# Patient Record
Sex: FEMALE | Race: WHITE | NOT HISPANIC OR LATINO | Employment: FULL TIME | URBAN - METROPOLITAN AREA
[De-identification: names, ages, dates, MRNs, and addresses within clinical notes are randomized per-mention and may not be internally consistent; named-entity substitution may affect disease eponyms.]

---

## 2022-06-06 ENCOUNTER — ANESTHESIA (OUTPATIENT)
Dept: SURGERY | Facility: HOSPITAL | Age: 22
DRG: 958 | End: 2022-06-06
Payer: MEDICAID

## 2022-06-06 ENCOUNTER — ANESTHESIA EVENT (OUTPATIENT)
Dept: SURGERY | Facility: HOSPITAL | Age: 22
DRG: 958 | End: 2022-06-06
Payer: MEDICAID

## 2022-06-06 ENCOUNTER — HOSPITAL ENCOUNTER (INPATIENT)
Facility: HOSPITAL | Age: 22
LOS: 1 days | Discharge: ANOTHER HOSPITAL | DRG: 958 | End: 2022-06-07
Attending: STUDENT IN AN ORGANIZED HEALTH CARE EDUCATION/TRAINING PROGRAM | Admitting: SURGERY
Payer: MEDICAID

## 2022-06-06 DIAGNOSIS — S58.112A: ICD-10-CM

## 2022-06-06 DIAGNOSIS — S01.81XA FACIAL LACERATION, INITIAL ENCOUNTER: ICD-10-CM

## 2022-06-06 DIAGNOSIS — G82.20 PARAPLEGIA: ICD-10-CM

## 2022-06-06 DIAGNOSIS — J93.9 BILATERAL PNEUMOTHORACES: ICD-10-CM

## 2022-06-06 DIAGNOSIS — S22.089A CLOSED FRACTURE OF TWELFTH THORACIC VERTEBRA, UNSPECIFIED FRACTURE MORPHOLOGY, INITIAL ENCOUNTER: Primary | ICD-10-CM

## 2022-06-06 DIAGNOSIS — T14.90XA TRAUMA: ICD-10-CM

## 2022-06-06 DIAGNOSIS — S00.03XA HEMATOMA OF SCALP, INITIAL ENCOUNTER: ICD-10-CM

## 2022-06-06 DIAGNOSIS — S32.9XXA CLOSED NONDISPLACED FRACTURE OF PELVIS, UNSPECIFIED PART OF PELVIS, INITIAL ENCOUNTER: ICD-10-CM
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
## 2022-06-06 PROBLEM — S68.412A: Status: ACTIVE | Noted: 2022-06-06

## 2022-06-06 LAB
ABO + RH BLD: NORMAL
ALBUMIN SERPL-MCNC: 3.5 GM/DL (ref 3.5–5)
ALBUMIN/GLOB SERPL: 1.4 RATIO (ref 1.1–2)
ALP SERPL-CCNC: 98 UNIT/L (ref 40–150)
ALT SERPL-CCNC: 147 UNIT/L (ref 0–55)
APTT PPP: 27.1 SECONDS (ref 23.2–33.7)
AST SERPL-CCNC: 257 UNIT/L (ref 5–34)
BASOPHILS # BLD AUTO: 0.1 X10(3)/MCL (ref 0–0.2)
BASOPHILS NFR BLD AUTO: 0.4 %
BILIRUBIN DIRECT+TOT PNL SERPL-MCNC: 0.6 MG/DL
BLD PROD TYP BPU: NORMAL
BLOOD UNIT EXPIRATION DATE: NORMAL
BLOOD UNIT TYPE CODE: 6200
BUN SERPL-MCNC: 13.4 MG/DL (ref 7–18.7)
CALCIUM SERPL-MCNC: 8.7 MG/DL (ref 8.4–10.2)
CHLORIDE SERPL-SCNC: 109 MMOL/L (ref 98–107)
CO2 SERPL-SCNC: 20 MMOL/L (ref 22–29)
CREAT SERPL-MCNC: 1.04 MG/DL (ref 0.55–1.02)
CROSSMATCH INTERPRETATION: NORMAL
DISPENSE STATUS: NORMAL
EOSINOPHIL # BLD AUTO: 0.14 X10(3)/MCL (ref 0–0.9)
EOSINOPHIL NFR BLD AUTO: 0.6 %
ERYTHROCYTE [DISTWIDTH] IN BLOOD BY AUTOMATED COUNT: 13.7 % (ref 11.5–17)
ETHANOL SERPL-MCNC: <10 MG/DL
GLOBULIN SER-MCNC: 2.5 GM/DL (ref 2.4–3.5)
GLUCOSE SERPL-MCNC: 191 MG/DL (ref 74–100)
HCT VFR BLD AUTO: 38.7 % (ref 37–47)
HGB BLD-MCNC: 13.3 GM/DL (ref 12–16)
IMM GRANULOCYTES # BLD AUTO: 0.6 X10(3)/MCL (ref 0–0.02)
IMM GRANULOCYTES NFR BLD AUTO: 2.4 % (ref 0–0.43)
INR BLD: 1.3 (ref 0–1.3)
LACTATE SERPL-SCNC: 2.9 MMOL/L (ref 0.5–2.2)
LACTATE SERPL-SCNC: 3 MMOL/L (ref 0.5–2.2)
LYMPHOCYTES # BLD AUTO: 2.64 X10(3)/MCL (ref 0.6–4.6)
LYMPHOCYTES NFR BLD AUTO: 10.8 %
MCH RBC QN AUTO: 31.1 PG (ref 27–31)
MCHC RBC AUTO-ENTMCNC: 34.4 MG/DL (ref 33–36)
MCV RBC AUTO: 90.4 FL (ref 80–94)
MONOCYTES # BLD AUTO: 0.75 X10(3)/MCL (ref 0.1–1.3)
MONOCYTES NFR BLD AUTO: 3.1 %
NEUTROPHILS # BLD AUTO: 20.3 X10(3)/MCL (ref 2.1–9.2)
NEUTROPHILS NFR BLD AUTO: 82.7 %
NRBC BLD AUTO-RTO: 0 %
PLATELET # BLD AUTO: 346 X10(3)/MCL (ref 130–400)
PMV BLD AUTO: 9.8 FL (ref 9.4–12.4)
POTASSIUM SERPL-SCNC: 4 MMOL/L (ref 3.5–5.1)
PROT SERPL-MCNC: 6 GM/DL (ref 6.4–8.3)
PROTHROMBIN TIME: 16 SECONDS (ref 12.5–14.5)
RBC # BLD AUTO: 4.28 X10(6)/MCL (ref 4.2–5.4)
SODIUM SERPL-SCNC: 140 MMOL/L (ref 136–145)
UNIT NUMBER: NORMAL
WBC # SPEC AUTO: 24.5 X10(3)/MCL (ref 4.5–11.5)

## 2022-06-06 PROCEDURE — 63600175 PHARM REV CODE 636 W HCPCS: Performed by: STUDENT IN AN ORGANIZED HEALTH CARE EDUCATION/TRAINING PROGRAM

## 2022-06-06 PROCEDURE — 11012 DEB SKIN BONE AT FX SITE: CPT | Mod: 51,,, | Performed by: ORTHOPAEDIC SURGERY

## 2022-06-06 PROCEDURE — G0390 TRAUMA RESPONS W/HOSP CRITI: HCPCS

## 2022-06-06 PROCEDURE — 36000711: Performed by: ORTHOPAEDIC SURGERY

## 2022-06-06 PROCEDURE — 82077 ASSAY SPEC XCP UR&BREATH IA: CPT | Performed by: SURGERY

## 2022-06-06 PROCEDURE — 83605 ASSAY OF LACTIC ACID: CPT | Performed by: STUDENT IN AN ORGANIZED HEALTH CARE EDUCATION/TRAINING PROGRAM

## 2022-06-06 PROCEDURE — 37000008 HC ANESTHESIA 1ST 15 MINUTES: Performed by: ORTHOPAEDIC SURGERY

## 2022-06-06 PROCEDURE — 20000000 HC ICU ROOM

## 2022-06-06 PROCEDURE — 37000009 HC ANESTHESIA EA ADD 15 MINS: Performed by: ORTHOPAEDIC SURGERY

## 2022-06-06 PROCEDURE — 36415 COLL VENOUS BLD VENIPUNCTURE: CPT | Performed by: SURGERY

## 2022-06-06 PROCEDURE — 80053 COMPREHEN METABOLIC PANEL: CPT | Performed by: SURGERY

## 2022-06-06 PROCEDURE — 86920 COMPATIBILITY TEST SPIN: CPT | Performed by: SURGERY

## 2022-06-06 PROCEDURE — P9017 PLASMA 1 DONOR FRZ W/IN 8 HR: HCPCS | Performed by: SURGERY

## 2022-06-06 PROCEDURE — 96374 THER/PROPH/DIAG INJ IV PUSH: CPT

## 2022-06-06 PROCEDURE — 63600175 PHARM REV CODE 636 W HCPCS: Performed by: ORTHOPAEDIC SURGERY

## 2022-06-06 PROCEDURE — 36000710: Performed by: ORTHOPAEDIC SURGERY

## 2022-06-06 PROCEDURE — 63600175 PHARM REV CODE 636 W HCPCS

## 2022-06-06 PROCEDURE — 86850 RBC ANTIBODY SCREEN: CPT | Performed by: SURGERY

## 2022-06-06 PROCEDURE — 25900 AMPUTATION OF FOREARM: CPT | Mod: LT,,, | Performed by: ORTHOPAEDIC SURGERY

## 2022-06-06 PROCEDURE — 71000033 HC RECOVERY, INTIAL HOUR: Performed by: ORTHOPAEDIC SURGERY

## 2022-06-06 PROCEDURE — 25000003 PHARM REV CODE 250: Performed by: ORTHOPAEDIC SURGERY

## 2022-06-06 PROCEDURE — 63600175 PHARM REV CODE 636 W HCPCS: Performed by: ANESTHESIOLOGY

## 2022-06-06 PROCEDURE — P9016 RBC LEUKOCYTES REDUCED: HCPCS | Performed by: SURGERY

## 2022-06-06 PROCEDURE — 36415 COLL VENOUS BLD VENIPUNCTURE: CPT | Performed by: STUDENT IN AN ORGANIZED HEALTH CARE EDUCATION/TRAINING PROGRAM

## 2022-06-06 PROCEDURE — 85610 PROTHROMBIN TIME: CPT | Performed by: SURGERY

## 2022-06-06 PROCEDURE — 83605 ASSAY OF LACTIC ACID: CPT | Performed by: SURGERY

## 2022-06-06 PROCEDURE — 99285 EMERGENCY DEPT VISIT HI MDM: CPT | Mod: 25

## 2022-06-06 PROCEDURE — 85025 COMPLETE CBC W/AUTO DIFF WBC: CPT | Performed by: SURGERY

## 2022-06-06 PROCEDURE — 11012 PR DEBRIDE ASSOC OPEN FX/DISLO SKIN/MUS/BONE: ICD-10-PCS | Mod: 51,,, | Performed by: ORTHOPAEDIC SURGERY

## 2022-06-06 PROCEDURE — 25000003 PHARM REV CODE 250

## 2022-06-06 PROCEDURE — 25500020 PHARM REV CODE 255: Performed by: STUDENT IN AN ORGANIZED HEALTH CARE EDUCATION/TRAINING PROGRAM

## 2022-06-06 PROCEDURE — 85730 THROMBOPLASTIN TIME PARTIAL: CPT | Performed by: SURGERY

## 2022-06-06 PROCEDURE — 25900: ICD-10-PCS | Mod: AS,LT,, | Performed by: PHYSICIAN ASSISTANT

## 2022-06-06 PROCEDURE — 25900: ICD-10-PCS | Mod: LT,,, | Performed by: ORTHOPAEDIC SURGERY

## 2022-06-06 PROCEDURE — 25900 AMPUTATION OF FOREARM: CPT | Mod: AS,LT,, | Performed by: PHYSICIAN ASSISTANT

## 2022-06-06 RX ORDER — GABAPENTIN 300 MG/1
300 CAPSULE ORAL 3 TIMES DAILY
Status: DISCONTINUED | OUTPATIENT
Start: 2022-06-07 | End: 2022-06-07 | Stop reason: HOSPADM

## 2022-06-06 RX ORDER — ONDANSETRON 2 MG/ML
4 INJECTION INTRAMUSCULAR; INTRAVENOUS DAILY PRN
Status: DISCONTINUED | OUTPATIENT
Start: 2022-06-06 | End: 2022-06-06

## 2022-06-06 RX ORDER — ONDANSETRON 2 MG/ML
INJECTION INTRAMUSCULAR; INTRAVENOUS
Status: DISCONTINUED | OUTPATIENT
Start: 2022-06-06 | End: 2022-06-06

## 2022-06-06 RX ORDER — FENTANYL CITRATE 50 UG/ML
INJECTION, SOLUTION INTRAMUSCULAR; INTRAVENOUS
Status: DISPENSED
Start: 2022-06-06 | End: 2022-06-07

## 2022-06-06 RX ORDER — BACITRACIN 500 [USP'U]/G
OINTMENT TOPICAL
Status: DISCONTINUED | OUTPATIENT
Start: 2022-06-06 | End: 2022-06-06 | Stop reason: HOSPADM

## 2022-06-06 RX ORDER — MIDAZOLAM HYDROCHLORIDE 1 MG/ML
INJECTION INTRAMUSCULAR; INTRAVENOUS
Status: DISCONTINUED | OUTPATIENT
Start: 2022-06-06 | End: 2022-06-06

## 2022-06-06 RX ORDER — ACETAMINOPHEN 10 MG/ML
1000 INJECTION, SOLUTION INTRAVENOUS ONCE
Status: COMPLETED | OUTPATIENT
Start: 2022-06-06 | End: 2022-06-06

## 2022-06-06 RX ORDER — ONDANSETRON 2 MG/ML
INJECTION INTRAMUSCULAR; INTRAVENOUS
Status: DISPENSED
Start: 2022-06-06 | End: 2022-06-07

## 2022-06-06 RX ORDER — SODIUM CHLORIDE 0.9 % (FLUSH) 0.9 %
10 SYRINGE (ML) INJECTION
Status: DISCONTINUED | OUTPATIENT
Start: 2022-06-06 | End: 2022-06-07 | Stop reason: HOSPADM

## 2022-06-06 RX ORDER — SODIUM CHLORIDE, SODIUM LACTATE, POTASSIUM CHLORIDE, CALCIUM CHLORIDE 600; 310; 30; 20 MG/100ML; MG/100ML; MG/100ML; MG/100ML
INJECTION, SOLUTION INTRAVENOUS CONTINUOUS
Status: DISCONTINUED | OUTPATIENT
Start: 2022-06-07 | End: 2022-06-07 | Stop reason: HOSPADM

## 2022-06-06 RX ORDER — KETAMINE HYDROCHLORIDE 100 MG/ML
INJECTION, SOLUTION INTRAMUSCULAR; INTRAVENOUS
Status: DISCONTINUED | OUTPATIENT
Start: 2022-06-06 | End: 2022-06-06

## 2022-06-06 RX ORDER — MORPHINE SULFATE 4 MG/ML
4 INJECTION, SOLUTION INTRAMUSCULAR; INTRAVENOUS
Status: DISCONTINUED | OUTPATIENT
Start: 2022-06-07 | End: 2022-06-07 | Stop reason: HOSPADM

## 2022-06-06 RX ORDER — OXYCODONE HYDROCHLORIDE 5 MG/1
10 TABLET ORAL EVERY 6 HOURS PRN
Status: DISCONTINUED | OUTPATIENT
Start: 2022-06-06 | End: 2022-06-07 | Stop reason: HOSPADM

## 2022-06-06 RX ORDER — ACETAMINOPHEN 500 MG
1000 TABLET ORAL EVERY 6 HOURS
Status: DISCONTINUED | OUTPATIENT
Start: 2022-06-07 | End: 2022-06-07 | Stop reason: HOSPADM

## 2022-06-06 RX ORDER — HYDROMORPHONE HYDROCHLORIDE 2 MG/ML
0.5 INJECTION, SOLUTION INTRAMUSCULAR; INTRAVENOUS; SUBCUTANEOUS EVERY 5 MIN PRN
Status: DISCONTINUED | OUTPATIENT
Start: 2022-06-06 | End: 2022-06-07 | Stop reason: HOSPADM

## 2022-06-06 RX ORDER — BUPIVACAINE HYDROCHLORIDE AND EPINEPHRINE 2.5; 5 MG/ML; UG/ML
INJECTION, SOLUTION EPIDURAL; INFILTRATION; INTRACAUDAL; PERINEURAL
Status: DISCONTINUED | OUTPATIENT
Start: 2022-06-06 | End: 2022-06-06 | Stop reason: HOSPADM

## 2022-06-06 RX ORDER — ROCURONIUM BROMIDE 10 MG/ML
INJECTION, SOLUTION INTRAVENOUS
Status: DISCONTINUED | OUTPATIENT
Start: 2022-06-06 | End: 2022-06-06

## 2022-06-06 RX ORDER — TRANEXAMIC ACID 100 MG/ML
INJECTION, SOLUTION INTRAVENOUS
Status: DISPENSED
Start: 2022-06-06 | End: 2022-06-07

## 2022-06-06 RX ORDER — METHOCARBAMOL 100 MG/ML
1000 INJECTION, SOLUTION INTRAMUSCULAR; INTRAVENOUS EVERY 8 HOURS
Status: DISCONTINUED | OUTPATIENT
Start: 2022-06-06 | End: 2022-06-07 | Stop reason: HOSPADM

## 2022-06-06 RX ORDER — OXYCODONE HYDROCHLORIDE 5 MG/1
5 TABLET ORAL EVERY 6 HOURS PRN
Status: DISCONTINUED | OUTPATIENT
Start: 2022-06-06 | End: 2022-06-07 | Stop reason: HOSPADM

## 2022-06-06 RX ORDER — FENTANYL CITRATE 50 UG/ML
INJECTION, SOLUTION INTRAMUSCULAR; INTRAVENOUS
Status: DISCONTINUED | OUTPATIENT
Start: 2022-06-06 | End: 2022-06-06

## 2022-06-06 RX ORDER — ONDANSETRON 2 MG/ML
INJECTION INTRAMUSCULAR; INTRAVENOUS CODE/TRAUMA/SEDATION MEDICATION
Status: COMPLETED | OUTPATIENT
Start: 2022-06-06 | End: 2022-06-06

## 2022-06-06 RX ORDER — PHENYLEPHRINE HYDROCHLORIDE 10 MG/ML
INJECTION INTRAVENOUS
Status: DISCONTINUED | OUTPATIENT
Start: 2022-06-06 | End: 2022-06-06

## 2022-06-06 RX ORDER — PROPOFOL 10 MG/ML
VIAL (ML) INTRAVENOUS
Status: DISCONTINUED | OUTPATIENT
Start: 2022-06-06 | End: 2022-06-06

## 2022-06-06 RX ORDER — FENTANYL CITRATE 50 UG/ML
INJECTION, SOLUTION INTRAMUSCULAR; INTRAVENOUS CODE/TRAUMA/SEDATION MEDICATION
Status: COMPLETED | OUTPATIENT
Start: 2022-06-06 | End: 2022-06-06

## 2022-06-06 RX ORDER — LIDOCAINE HYDROCHLORIDE 20 MG/ML
INJECTION, SOLUTION EPIDURAL; INFILTRATION; INTRACAUDAL; PERINEURAL
Status: DISCONTINUED | OUTPATIENT
Start: 2022-06-06 | End: 2022-06-06

## 2022-06-06 RX ORDER — SUCCINYLCHOLINE CHLORIDE 20 MG/ML
INJECTION INTRAMUSCULAR; INTRAVENOUS
Status: DISCONTINUED | OUTPATIENT
Start: 2022-06-06 | End: 2022-06-06

## 2022-06-06 RX ORDER — ONDANSETRON 2 MG/ML
4 INJECTION INTRAMUSCULAR; INTRAVENOUS EVERY 6 HOURS PRN
Status: DISCONTINUED | OUTPATIENT
Start: 2022-06-06 | End: 2022-06-07 | Stop reason: HOSPADM

## 2022-06-06 RX ORDER — CEFAZOLIN SODIUM 2 G/50ML
2 SOLUTION INTRAVENOUS
Status: DISCONTINUED | OUTPATIENT
Start: 2022-06-06 | End: 2022-06-07 | Stop reason: HOSPADM

## 2022-06-06 RX ORDER — MORPHINE SULFATE 4 MG/ML
3 INJECTION, SOLUTION INTRAMUSCULAR; INTRAVENOUS
Status: DISCONTINUED | OUTPATIENT
Start: 2022-06-06 | End: 2022-06-06

## 2022-06-06 RX ORDER — MORPHINE SULFATE 10 MG/ML
4 INJECTION INTRAMUSCULAR; INTRAVENOUS; SUBCUTANEOUS
Status: DISCONTINUED | OUTPATIENT
Start: 2022-06-06 | End: 2022-06-06

## 2022-06-06 RX ADMIN — MIDAZOLAM 2 MG: 1 INJECTION INTRAMUSCULAR; INTRAVENOUS at 07:06

## 2022-06-06 RX ADMIN — KETAMINE HYDROCHLORIDE 20 MG: 100 INJECTION, SOLUTION, CONCENTRATE INTRAMUSCULAR; INTRAVENOUS at 09:06

## 2022-06-06 RX ADMIN — LIDOCAINE HYDROCHLORIDE 100 MG: 20 INJECTION, SOLUTION EPIDURAL; INFILTRATION; INTRACAUDAL; PERINEURAL at 07:06

## 2022-06-06 RX ADMIN — ACETAMINOPHEN 1000 MG: 10 INJECTION, SOLUTION INTRAVENOUS at 10:06

## 2022-06-06 RX ADMIN — IOPAMIDOL 100 ML: 755 INJECTION, SOLUTION INTRAVENOUS at 07:06

## 2022-06-06 RX ADMIN — SODIUM CHLORIDE, POTASSIUM CHLORIDE, SODIUM LACTATE AND CALCIUM CHLORIDE: 600; 310; 30; 20 INJECTION, SOLUTION INTRAVENOUS at 11:06

## 2022-06-06 RX ADMIN — PHENYLEPHRINE HYDROCHLORIDE 200 MCG: 10 INJECTION INTRAVENOUS at 08:06

## 2022-06-06 RX ADMIN — ONDANSETRON 4 MG: 2 INJECTION INTRAMUSCULAR; INTRAVENOUS at 07:06

## 2022-06-06 RX ADMIN — PHENYLEPHRINE HYDROCHLORIDE 200 MCG: 10 INJECTION INTRAVENOUS at 07:06

## 2022-06-06 RX ADMIN — METHOCARBAMOL 1000 MG: 100 INJECTION, SOLUTION INTRAMUSCULAR; INTRAVENOUS at 11:06

## 2022-06-06 RX ADMIN — PROPOFOL 100 MG: 10 INJECTION, EMULSION INTRAVENOUS at 07:06

## 2022-06-06 RX ADMIN — CEFAZOLIN SODIUM 2 G: 2 SOLUTION INTRAVENOUS at 08:06

## 2022-06-06 RX ADMIN — HYDROMORPHONE HYDROCHLORIDE 0.5 MG: 2 INJECTION INTRAMUSCULAR; INTRAVENOUS; SUBCUTANEOUS at 10:06

## 2022-06-06 RX ADMIN — ROCURONIUM BROMIDE 40 MG: 10 INJECTION, SOLUTION INTRAVENOUS at 07:06

## 2022-06-06 RX ADMIN — IOPAMIDOL 75 ML: 755 INJECTION, SOLUTION INTRAVENOUS at 11:06

## 2022-06-06 RX ADMIN — SODIUM CHLORIDE, SODIUM GLUCONATE, SODIUM ACETATE, POTASSIUM CHLORIDE AND MAGNESIUM CHLORIDE: 526; 502; 368; 37; 30 INJECTION, SOLUTION INTRAVENOUS at 07:06

## 2022-06-06 RX ADMIN — ONDANSETRON 4 MG: 2 INJECTION INTRAMUSCULAR; INTRAVENOUS at 08:06

## 2022-06-06 RX ADMIN — ROCURONIUM BROMIDE 10 MG: 10 INJECTION, SOLUTION INTRAVENOUS at 07:06

## 2022-06-06 RX ADMIN — FENTANYL CITRATE 100 MCG: 50 INJECTION, SOLUTION INTRAMUSCULAR; INTRAVENOUS at 07:06

## 2022-06-06 RX ADMIN — MORPHINE SULFATE 4 MG: 10 INJECTION INTRAVENOUS at 11:06

## 2022-06-06 RX ADMIN — SUGAMMADEX 200 MG: 100 INJECTION, SOLUTION INTRAVENOUS at 09:06

## 2022-06-06 RX ADMIN — FENTANYL CITRATE 50 MCG: 50 INJECTION, SOLUTION INTRAMUSCULAR; INTRAVENOUS at 09:06

## 2022-06-06 RX ADMIN — SUCCINYLCHOLINE CHLORIDE 140 MG: 20 INJECTION, SOLUTION INTRAMUSCULAR; INTRAVENOUS at 07:06
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
## 2022-06-07 VITALS
SYSTOLIC BLOOD PRESSURE: 94 MMHG | HEART RATE: 91 BPM | TEMPERATURE: 99 F | RESPIRATION RATE: 20 BRPM | OXYGEN SATURATION: 100 % | DIASTOLIC BLOOD PRESSURE: 65 MMHG

## 2022-06-07 LAB
ABO + RH BLD: NORMAL
ALBUMIN SERPL-MCNC: 3.4 GM/DL (ref 3.5–5)
ALBUMIN SERPL-MCNC: 3.4 GM/DL (ref 3.5–5)
ALBUMIN/GLOB SERPL: 1.4 RATIO (ref 1.1–2)
ALBUMIN/GLOB SERPL: 1.4 RATIO (ref 1.1–2)
ALP SERPL-CCNC: 84 UNIT/L (ref 40–150)
ALP SERPL-CCNC: 86 UNIT/L (ref 40–150)
ALT SERPL-CCNC: 123 UNIT/L (ref 0–55)
ALT SERPL-CCNC: 129 UNIT/L (ref 0–55)
AMPHET UR QL SCN: NEGATIVE
APPEARANCE UR: CLEAR
APTT PPP: 20.2 SECONDS (ref 23.2–33.7)
AST SERPL-CCNC: 210 UNIT/L (ref 5–34)
AST SERPL-CCNC: 242 UNIT/L (ref 5–34)
BACTERIA #/AREA URNS AUTO: ABNORMAL /HPF
BARBITURATE SCN PRESENT UR: NEGATIVE
BASOPHILS # BLD AUTO: 0.02 X10(3)/MCL (ref 0–0.2)
BASOPHILS # BLD AUTO: 0.02 X10(3)/MCL (ref 0–0.2)
BASOPHILS # BLD AUTO: 0.03 X10(3)/MCL (ref 0–0.2)
BASOPHILS NFR BLD AUTO: 0.2 %
BASOPHILS NFR BLD AUTO: 0.2 %
BASOPHILS NFR BLD AUTO: 0.3 %
BENZODIAZ UR QL SCN: POSITIVE
BILIRUB UR QL STRIP.AUTO: NEGATIVE MG/DL
BILIRUBIN DIRECT+TOT PNL SERPL-MCNC: 1.4 MG/DL
BILIRUBIN DIRECT+TOT PNL SERPL-MCNC: 1.4 MG/DL
BLD PROD TYP BPU: NORMAL
BLOOD UNIT EXPIRATION DATE: NORMAL
BLOOD UNIT TYPE CODE: 9500
BUN SERPL-MCNC: 10.4 MG/DL (ref 7–18.7)
BUN SERPL-MCNC: 11.2 MG/DL (ref 7–18.7)
CALCIUM SERPL-MCNC: 8.7 MG/DL (ref 8.4–10.2)
CALCIUM SERPL-MCNC: 8.8 MG/DL (ref 8.4–10.2)
CANNABINOIDS UR QL SCN: NEGATIVE
CHLORIDE SERPL-SCNC: 106 MMOL/L (ref 98–107)
CHLORIDE SERPL-SCNC: 107 MMOL/L (ref 98–107)
CO2 SERPL-SCNC: 23 MMOL/L (ref 22–29)
CO2 SERPL-SCNC: 25 MMOL/L (ref 22–29)
COCAINE UR QL SCN: NEGATIVE
COLOR UR AUTO: YELLOW
CREAT SERPL-MCNC: 0.83 MG/DL (ref 0.55–1.02)
CREAT SERPL-MCNC: 0.88 MG/DL (ref 0.55–1.02)
CROSSMATCH INTERPRETATION: NORMAL
DISPENSE STATUS: NORMAL
EOSINOPHIL # BLD AUTO: 0 X10(3)/MCL (ref 0–0.9)
EOSINOPHIL NFR BLD AUTO: 0 %
ERYTHROCYTE [DISTWIDTH] IN BLOOD BY AUTOMATED COUNT: 14.8 % (ref 11.5–17)
ERYTHROCYTE [DISTWIDTH] IN BLOOD BY AUTOMATED COUNT: 15 % (ref 11.5–17)
ERYTHROCYTE [DISTWIDTH] IN BLOOD BY AUTOMATED COUNT: 15.3 % (ref 11.5–17)
FENTANYL UR QL SCN: POSITIVE
GLOBULIN SER-MCNC: 2.4 GM/DL (ref 2.4–3.5)
GLOBULIN SER-MCNC: 2.5 GM/DL (ref 2.4–3.5)
GLUCOSE SERPL-MCNC: 142 MG/DL (ref 74–100)
GLUCOSE SERPL-MCNC: 152 MG/DL (ref 74–100)
GLUCOSE UR QL STRIP.AUTO: NEGATIVE MG/DL
HCT VFR BLD AUTO: 36.7 % (ref 37–47)
HCT VFR BLD AUTO: 36.7 % (ref 37–47)
HCT VFR BLD AUTO: 37.8 % (ref 37–47)
HGB BLD-MCNC: 12.3 GM/DL (ref 12–16)
HGB BLD-MCNC: 12.7 GM/DL (ref 12–16)
HGB BLD-MCNC: 12.8 GM/DL (ref 12–16)
IMM GRANULOCYTES # BLD AUTO: 0.03 X10(3)/MCL (ref 0–0.02)
IMM GRANULOCYTES # BLD AUTO: 0.05 X10(3)/MCL (ref 0–0.02)
IMM GRANULOCYTES # BLD AUTO: 0.05 X10(3)/MCL (ref 0–0.02)
IMM GRANULOCYTES NFR BLD AUTO: 0.3 % (ref 0–0.43)
IMM GRANULOCYTES NFR BLD AUTO: 0.4 % (ref 0–0.43)
IMM GRANULOCYTES NFR BLD AUTO: 0.4 % (ref 0–0.43)
INR BLD: 1.24 (ref 0–1.3)
KETONES UR QL STRIP.AUTO: NEGATIVE MG/DL
LACTATE SERPL-SCNC: 1.6 MMOL/L (ref 0.5–2.2)
LEUKOCYTE ESTERASE UR QL STRIP.AUTO: NEGATIVE UNIT/L
LYMPHOCYTES # BLD AUTO: 0.25 X10(3)/MCL (ref 0.6–4.6)
LYMPHOCYTES # BLD AUTO: 0.4 X10(3)/MCL (ref 0.6–4.6)
LYMPHOCYTES # BLD AUTO: 0.6 X10(3)/MCL (ref 0.6–4.6)
LYMPHOCYTES NFR BLD AUTO: 2.1 %
LYMPHOCYTES NFR BLD AUTO: 3.5 %
LYMPHOCYTES NFR BLD AUTO: 5.8 %
MAGNESIUM SERPL-MCNC: 2 MG/DL (ref 1.6–2.6)
MAGNESIUM SERPL-MCNC: 2 MG/DL (ref 1.6–2.6)
MCH RBC QN AUTO: 29 PG (ref 27–31)
MCH RBC QN AUTO: 29.4 PG (ref 27–31)
MCH RBC QN AUTO: 29.7 PG (ref 27–31)
MCHC RBC AUTO-ENTMCNC: 33.5 MG/DL (ref 33–36)
MCHC RBC AUTO-ENTMCNC: 33.9 MG/DL (ref 33–36)
MCHC RBC AUTO-ENTMCNC: 34.6 MG/DL (ref 33–36)
MCV RBC AUTO: 85.7 FL (ref 80–94)
MCV RBC AUTO: 86.6 FL (ref 80–94)
MCV RBC AUTO: 86.7 FL (ref 80–94)
MDMA UR QL SCN: NEGATIVE
MONOCYTES # BLD AUTO: 0.77 X10(3)/MCL (ref 0.1–1.3)
MONOCYTES # BLD AUTO: 0.82 X10(3)/MCL (ref 0.1–1.3)
MONOCYTES # BLD AUTO: 0.86 X10(3)/MCL (ref 0.1–1.3)
MONOCYTES NFR BLD AUTO: 7.1 %
MONOCYTES NFR BLD AUTO: 7.3 %
MONOCYTES NFR BLD AUTO: 7.4 %
NEUTROPHILS # BLD AUTO: 10.3 X10(3)/MCL (ref 2.1–9.2)
NEUTROPHILS # BLD AUTO: 10.7 X10(3)/MCL (ref 2.1–9.2)
NEUTROPHILS # BLD AUTO: 8.9 X10(3)/MCL (ref 2.1–9.2)
NEUTROPHILS NFR BLD AUTO: 86.2 %
NEUTROPHILS NFR BLD AUTO: 88.8 %
NEUTROPHILS NFR BLD AUTO: 90 %
NITRITE UR QL STRIP.AUTO: NEGATIVE
NRBC BLD AUTO-RTO: 0 %
OPIATES UR QL SCN: POSITIVE
PCP UR QL: NEGATIVE
PH UR STRIP.AUTO: 5.5 [PH]
PH UR: 5.5 [PH] (ref 3–11)
PHOSPHATE SERPL-MCNC: 2.8 MG/DL (ref 2.3–4.7)
PHOSPHATE SERPL-MCNC: 4.3 MG/DL (ref 2.3–4.7)
PLATELET # BLD AUTO: 164 X10(3)/MCL (ref 130–400)
PLATELET # BLD AUTO: 178 X10(3)/MCL (ref 130–400)
PLATELET # BLD AUTO: 184 X10(3)/MCL (ref 130–400)
PMV BLD AUTO: 10.4 FL (ref 9.4–12.4)
PMV BLD AUTO: 9.5 FL (ref 9.4–12.4)
PMV BLD AUTO: 9.9 FL (ref 9.4–12.4)
POTASSIUM SERPL-SCNC: 3.5 MMOL/L (ref 3.5–5.1)
POTASSIUM SERPL-SCNC: 4.1 MMOL/L (ref 3.5–5.1)
PROT SERPL-MCNC: 5.8 GM/DL (ref 6.4–8.3)
PROT SERPL-MCNC: 5.9 GM/DL (ref 6.4–8.3)
PROT UR QL STRIP.AUTO: ABNORMAL MG/DL
PROTHROMBIN TIME: 15.5 SECONDS (ref 12.5–14.5)
RBC # BLD AUTO: 4.24 X10(6)/MCL (ref 4.2–5.4)
RBC # BLD AUTO: 4.28 X10(6)/MCL (ref 4.2–5.4)
RBC # BLD AUTO: 4.36 X10(6)/MCL (ref 4.2–5.4)
RBC #/AREA URNS AUTO: 50 /HPF
RBC UR QL AUTO: ABNORMAL UNIT/L
SARS-COV-2 RDRP RESP QL NAA+PROBE: NEGATIVE
SODIUM SERPL-SCNC: 138 MMOL/L (ref 136–145)
SODIUM SERPL-SCNC: 139 MMOL/L (ref 136–145)
SP GR UR STRIP.AUTO: >=1.04 (ref 1–1.03)
SPECIFIC GRAVITY, URINE AUTO (.000) (OHS): >=1.04 (ref 1–1.03)
SQUAMOUS #/AREA URNS AUTO: <4 /LPF
UNIT NUMBER: NORMAL
UROBILINOGEN UR STRIP-ACNC: 1 MG/DL
WBC # SPEC AUTO: 10.4 X10(3)/MCL (ref 4.5–11.5)
WBC # SPEC AUTO: 11.6 X10(3)/MCL (ref 4.5–11.5)
WBC # SPEC AUTO: 11.8 X10(3)/MCL (ref 4.5–11.5)
WBC #/AREA URNS AUTO: <5 /HPF

## 2022-06-07 PROCEDURE — 85025 COMPLETE CBC W/AUTO DIFF WBC: CPT | Performed by: STUDENT IN AN ORGANIZED HEALTH CARE EDUCATION/TRAINING PROGRAM

## 2022-06-07 PROCEDURE — 36415 COLL VENOUS BLD VENIPUNCTURE: CPT | Performed by: STUDENT IN AN ORGANIZED HEALTH CARE EDUCATION/TRAINING PROGRAM

## 2022-06-07 PROCEDURE — 84100 ASSAY OF PHOSPHORUS: CPT | Performed by: SURGERY

## 2022-06-07 PROCEDURE — 83605 ASSAY OF LACTIC ACID: CPT | Performed by: SURGERY

## 2022-06-07 PROCEDURE — 85610 PROTHROMBIN TIME: CPT | Performed by: STUDENT IN AN ORGANIZED HEALTH CARE EDUCATION/TRAINING PROGRAM

## 2022-06-07 PROCEDURE — 25500020 PHARM REV CODE 255: Performed by: SURGERY

## 2022-06-07 PROCEDURE — 84100 ASSAY OF PHOSPHORUS: CPT | Performed by: STUDENT IN AN ORGANIZED HEALTH CARE EDUCATION/TRAINING PROGRAM

## 2022-06-07 PROCEDURE — 80307 DRUG TEST PRSMV CHEM ANLYZR: CPT | Performed by: STUDENT IN AN ORGANIZED HEALTH CARE EDUCATION/TRAINING PROGRAM

## 2022-06-07 PROCEDURE — 85730 THROMBOPLASTIN TIME PARTIAL: CPT | Performed by: STUDENT IN AN ORGANIZED HEALTH CARE EDUCATION/TRAINING PROGRAM

## 2022-06-07 PROCEDURE — 83735 ASSAY OF MAGNESIUM: CPT | Performed by: STUDENT IN AN ORGANIZED HEALTH CARE EDUCATION/TRAINING PROGRAM

## 2022-06-07 PROCEDURE — 80053 COMPREHEN METABOLIC PANEL: CPT | Performed by: SURGERY

## 2022-06-07 PROCEDURE — 81001 URINALYSIS AUTO W/SCOPE: CPT | Performed by: STUDENT IN AN ORGANIZED HEALTH CARE EDUCATION/TRAINING PROGRAM

## 2022-06-07 PROCEDURE — 99254 PR INITIAL INPATIENT CONSULT,LEVL IV: ICD-10-PCS | Mod: ,,, | Performed by: THORACIC SURGERY (CARDIOTHORACIC VASCULAR SURGERY)

## 2022-06-07 PROCEDURE — 63600175 PHARM REV CODE 636 W HCPCS: Performed by: STUDENT IN AN ORGANIZED HEALTH CARE EDUCATION/TRAINING PROGRAM

## 2022-06-07 PROCEDURE — 25000003 PHARM REV CODE 250: Performed by: SURGERY

## 2022-06-07 PROCEDURE — 87635 SARS-COV-2 COVID-19 AMP PRB: CPT | Performed by: NURSE PRACTITIONER

## 2022-06-07 PROCEDURE — 63600175 PHARM REV CODE 636 W HCPCS: Performed by: SURGERY

## 2022-06-07 PROCEDURE — 99231 SBSQ HOSP IP/OBS SF/LOW 25: CPT | Mod: ,,, | Performed by: SURGERY

## 2022-06-07 PROCEDURE — 25000003 PHARM REV CODE 250: Performed by: STUDENT IN AN ORGANIZED HEALTH CARE EDUCATION/TRAINING PROGRAM

## 2022-06-07 PROCEDURE — 63600175 PHARM REV CODE 636 W HCPCS: Performed by: ANESTHESIOLOGY

## 2022-06-07 PROCEDURE — 83735 ASSAY OF MAGNESIUM: CPT | Performed by: SURGERY

## 2022-06-07 PROCEDURE — 99254 IP/OBS CNSLTJ NEW/EST MOD 60: CPT | Mod: ,,, | Performed by: THORACIC SURGERY (CARDIOTHORACIC VASCULAR SURGERY)

## 2022-06-07 PROCEDURE — 80053 COMPREHEN METABOLIC PANEL: CPT | Performed by: STUDENT IN AN ORGANIZED HEALTH CARE EDUCATION/TRAINING PROGRAM

## 2022-06-07 PROCEDURE — 36415 COLL VENOUS BLD VENIPUNCTURE: CPT | Performed by: SURGERY

## 2022-06-07 PROCEDURE — 99231 PR SUBSEQUENT HOSPITAL CARE,LEVL I: ICD-10-PCS | Mod: ,,, | Performed by: SURGERY

## 2022-06-07 RX ORDER — HYDROMORPHONE HYDROCHLORIDE 2 MG/ML
0.5 INJECTION, SOLUTION INTRAMUSCULAR; INTRAVENOUS; SUBCUTANEOUS EVERY 5 MIN PRN
Status: CANCELLED | OUTPATIENT
Start: 2022-06-07

## 2022-06-07 RX ORDER — ONDANSETRON 2 MG/ML
4 INJECTION INTRAMUSCULAR; INTRAVENOUS EVERY 6 HOURS PRN
Status: CANCELLED | OUTPATIENT
Start: 2022-06-07

## 2022-06-07 RX ORDER — SODIUM CHLORIDE 0.9 % (FLUSH) 0.9 %
10 SYRINGE (ML) INJECTION
Status: CANCELLED | OUTPATIENT
Start: 2022-06-07

## 2022-06-07 RX ORDER — SODIUM CHLORIDE, SODIUM LACTATE, POTASSIUM CHLORIDE, CALCIUM CHLORIDE 600; 310; 30; 20 MG/100ML; MG/100ML; MG/100ML; MG/100ML
INJECTION, SOLUTION INTRAVENOUS CONTINUOUS
Status: CANCELLED | OUTPATIENT
Start: 2022-06-07

## 2022-06-07 RX ORDER — METHOCARBAMOL 100 MG/ML
1000 INJECTION, SOLUTION INTRAMUSCULAR; INTRAVENOUS EVERY 8 HOURS
Status: CANCELLED | OUTPATIENT
Start: 2022-06-07

## 2022-06-07 RX ORDER — BACITRACIN 500 [USP'U]/G
OINTMENT TOPICAL 3 TIMES DAILY
Status: CANCELLED | OUTPATIENT
Start: 2022-06-07

## 2022-06-07 RX ORDER — BACITRACIN 500 [USP'U]/G
OINTMENT TOPICAL 3 TIMES DAILY
Status: DISCONTINUED | OUTPATIENT
Start: 2022-06-07 | End: 2022-06-07 | Stop reason: HOSPADM

## 2022-06-07 RX ORDER — CEFAZOLIN SODIUM 2 G/50ML
2 SOLUTION INTRAVENOUS
Status: CANCELLED | OUTPATIENT
Start: 2022-06-07

## 2022-06-07 RX ORDER — ACETAMINOPHEN 500 MG
1000 TABLET ORAL EVERY 6 HOURS
Status: CANCELLED | OUTPATIENT
Start: 2022-06-07

## 2022-06-07 RX ORDER — OXYCODONE HYDROCHLORIDE 5 MG/1
10 TABLET ORAL EVERY 6 HOURS PRN
Status: CANCELLED | OUTPATIENT
Start: 2022-06-07 | End: 2022-06-09

## 2022-06-07 RX ORDER — OXYCODONE HYDROCHLORIDE 5 MG/1
5 TABLET ORAL EVERY 6 HOURS PRN
Status: CANCELLED | OUTPATIENT
Start: 2022-06-07 | End: 2022-06-09

## 2022-06-07 RX ORDER — GABAPENTIN 300 MG/1
300 CAPSULE ORAL 3 TIMES DAILY
Status: CANCELLED | OUTPATIENT
Start: 2022-06-07

## 2022-06-07 RX ORDER — MORPHINE SULFATE 4 MG/ML
4 INJECTION, SOLUTION INTRAMUSCULAR; INTRAVENOUS
Status: CANCELLED | OUTPATIENT
Start: 2022-06-07

## 2022-06-07 RX ADMIN — MORPHINE SULFATE 4 MG: 4 INJECTION INTRAVENOUS at 06:06

## 2022-06-07 RX ADMIN — GABAPENTIN 300 MG: 300 CAPSULE ORAL at 11:06

## 2022-06-07 RX ADMIN — MORPHINE SULFATE 4 MG: 4 INJECTION INTRAVENOUS at 11:06

## 2022-06-07 RX ADMIN — MORPHINE SULFATE 4 MG: 4 INJECTION INTRAVENOUS at 04:06

## 2022-06-07 RX ADMIN — ONDANSETRON 4 MG: 2 INJECTION INTRAMUSCULAR; INTRAVENOUS at 01:06

## 2022-06-07 RX ADMIN — CEFAZOLIN SODIUM 2 G: 2 SOLUTION INTRAVENOUS at 04:06

## 2022-06-07 RX ADMIN — HYDROMORPHONE HYDROCHLORIDE 0.5 MG: 2 INJECTION INTRAMUSCULAR; INTRAVENOUS; SUBCUTANEOUS at 03:06

## 2022-06-07 RX ADMIN — SODIUM CHLORIDE, POTASSIUM CHLORIDE, SODIUM LACTATE AND CALCIUM CHLORIDE: 600; 310; 30; 20 INJECTION, SOLUTION INTRAVENOUS at 11:06

## 2022-06-07 RX ADMIN — METHOCARBAMOL 1000 MG: 100 INJECTION, SOLUTION INTRAMUSCULAR; INTRAVENOUS at 01:06

## 2022-06-07 RX ADMIN — ACETAMINOPHEN 1000 MG: 500 TABLET, FILM COATED ORAL at 06:06

## 2022-06-07 RX ADMIN — OXYCODONE 5 MG: 5 TABLET ORAL at 01:06

## 2022-06-07 RX ADMIN — ACETAMINOPHEN 1000 MG: 500 TABLET, FILM COATED ORAL at 11:06

## 2022-06-07 RX ADMIN — MORPHINE SULFATE 4 MG: 4 INJECTION INTRAVENOUS at 02:06

## 2022-06-07 RX ADMIN — BACITRACIN: 500 OINTMENT TOPICAL at 02:06

## 2022-06-07 RX ADMIN — METHOCARBAMOL 1000 MG: 100 INJECTION, SOLUTION INTRAMUSCULAR; INTRAVENOUS at 05:06

## 2022-06-07 RX ADMIN — MORPHINE SULFATE 4 MG: 4 INJECTION INTRAVENOUS at 07:06

## 2022-06-07 RX ADMIN — GABAPENTIN 300 MG: 300 CAPSULE ORAL at 02:06

## 2022-06-07 RX ADMIN — HYDROMORPHONE HYDROCHLORIDE 0.5 MG: 2 INJECTION INTRAMUSCULAR; INTRAVENOUS; SUBCUTANEOUS at 02:06

## 2022-06-07 RX ADMIN — HYDROMORPHONE HYDROCHLORIDE 0.5 MG: 2 INJECTION INTRAMUSCULAR; INTRAVENOUS; SUBCUTANEOUS at 09:06
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
## 2022-06-07 NOTE — PLAN OF CARE
Lidia with [facility] called with Rm # [id number]. Dr Reji Caal is the accepting doctor.   I spoke to Cami SUN and she stated pt would need to travel by air med. I called [facility] [phone] and spoke with Steven and put pt in Will Call.  I called and spoke to pt's mom Juanita and gave her update. I spoke to pt, she nodded she understood, she seems very sleepy.  DC packet with med rec, disc with all radiology films given to nurse Kay. Pt will need stat raid covid. Once Covid result is complete and neg she can be transferred. I will give phone # to Kay to give report on pt.
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
## 2022-06-07 NOTE — PLAN OF CARE
Called [facility] and spoke to Ingris and faxed face sheet as requested.  [facility] [place] stated [place] was denies due to no beds.
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
## 2022-06-07 NOTE — PROGRESS NOTES
PRS  Sleeping this morning  Lac repairs are c/d/i  Bacitracin ointment TID to suture lines.  Plan for suture removal in about 1 week.

## 2022-06-07 NOTE — PLAN OF CARE
"Called [facility] in [facility], spoke to Mer with transfer center. Faxed clinicals as requested and had our radiology dept "[facility]" radiology films with [facility].  Mer states currently no ICU beds.  "

## 2022-06-07 NOTE — PROGRESS NOTES
Trauma Attending       GCS 15  Unable to feel lower ext, move lower ext  Facial laceration  Tender back  Obvious pelvic fx  Obvious left u ext amputation, hand in bag on ice  Abd: benign        FAST ? Fluid in pelvis      Initially SBP 87, responded to IVF and 2 u PRBCs      CT scan     No head injury    T 12 injury    Right Pelvic Fracture      No gross injury in abd    To OR for washout completion amputation v re implant     Ortho, Plastics, NSGy consulted    Await final report of scan    TO ICU after OR      CT scan confirms above and ? Pseudoaneurysm right internal iliac artery - consult vascular for opinion

## 2022-06-07 NOTE — PLAN OF CARE
Called [facility] in [place] and spoke with pam in transfer center. Faxed face sheet and insurance info as requested. She stated they do not have beds available but will update info to [facility]

## 2022-06-07 NOTE — PROGRESS NOTES
Discussed case w IR     No angio at this time due to HD stability and Dr Guradado reading of CT and CTA not consistent w active hemorrhage.     Dr. Guardado available if HD stability changes     Discussed w ICU staff as well as Orderdd CXR for AM due to B PTX    Labs still pending

## 2022-06-07 NOTE — SUBJECTIVE & OBJECTIVE
No current facility-administered medications on file prior to encounter.     No current outpatient medications on file prior to encounter.       Review of patient's allergies indicates:   Allergen Reactions    Codeine        History reviewed. No pertinent past medical history.  History reviewed. No pertinent surgical history.  Family History    None       Tobacco Use    Smoking status: Not on file    Smokeless tobacco: Not on file   Substance and Sexual Activity    Alcohol use: Not on file    Drug use: Not on file    Sexual activity: Not on file     Review of Systems   Reason unable to perform ROS: Patient is under the influence of narcotics with the C, resting comfortably in the intensive care unit.   Constitutional:  Negative for activity change, appetite change and diaphoresis.   Respiratory:  Negative for chest tightness and shortness of breath.    Cardiovascular:  Negative for chest pain and palpitations.   Objective:     Vital Signs (Most Recent):  Temp: 97.7 °F (36.5 °C) (06/07/22 0400)  Pulse: 92 (06/07/22 0630)  Resp: 15 (06/07/22 0630)  BP: 105/67 (06/07/22 0600)  SpO2: 100 % (06/07/22 0630) Vital Signs (24h Range):  Temp:  [97.2 °F (36.2 °C)-98.4 °F (36.9 °C)] 97.7 °F (36.5 °C)  Pulse:  [] 92  Resp:  [7-30] 15  SpO2:  [87 %-100 %] 100 %  BP: ()/(40-95) 105/67  Arterial Line BP: (100-139)/(46-66) 111/60        There is no height or weight on file to calculate BMI.    SpO2: 100 %  O2 Device (Oxygen Therapy): nasal cannula     Intake/Output - Last 3 Shifts         06/05 0700 06/06 0659 06/06 0700 06/07 0659    I.V.  506    IV Piggyback  1000    Total Intake  1506    Urine  1075    Other  50    Total Output  1125    Net  +381                   Lines/Drains/Airways       Drain  Duration                  Urethral Catheter 06/06/22 2108 Non-latex;Silicone 16 Fr. <1 day              Arterial Line  Duration             Arterial Line 06/06/22 2029 Right Radial <1 day              Peripheral  Intravenous Line  Duration                  Peripheral IV - Single Lumen 06/06/22 1907 20 G Right Antecubital <1 day         Peripheral IV - Single Lumen 06/07/22 0045 20 G Posterior;Right <1 day                     STS Risk Score: n/a    Physical Exam  Constitutional:       General: She is awake.      Appearance: She is ill-appearing.      Interventions: She is sedated. Cervical collar in place.   HENT:      Head: Normocephalic. Abrasion, contusion and laceration present.   Eyes:      General: Vision grossly intact. Gaze aligned appropriately.   Cardiovascular:      Rate and Rhythm: Normal rate and regular rhythm. No extrasystoles are present.     Pulses:           Carotid pulses are 2+ on the right side and 2+ on the left side.       Radial pulses are 2+ on the right side and 0 on the left side.        Femoral pulses are 2+ on the right side and 2+ on the left side.       Popliteal pulses are 2+ on the right side and 2+ on the left side.        Dorsalis pedis pulses are 2+ on the right side and 2+ on the left side.        Posterior tibial pulses are 2+ on the right side and 2+ on the left side.      Heart sounds: Heart sounds not distant. No murmur heard.    No friction rub. No gallop.   Pulmonary:      Effort: Pulmonary effort is normal. No respiratory distress.      Breath sounds: Normal breath sounds. No stridor.   Chest:      Chest wall: No mass, deformity, crepitus or edema.   Abdominal:      General: Abdomen is flat. Bowel sounds are decreased. There is no distension or abdominal bruit.      Palpations: Abdomen is soft.      Tenderness: There is generalized abdominal tenderness.   Musculoskeletal:      Left forearm: Deformity present.      Cervical back: Signs of trauma present.   Skin:     General: Skin is warm.      Capillary Refill: Capillary refill takes less than 2 seconds.   Neurological:      Mental Status: She is alert and easily aroused.       Significant Labs:  All pertinent labs from the last 24  hours have been reviewed.    Significant Diagnostics:  I have reviewed and interpreted all pertinent imaging results/findings within the past 24 hours.

## 2022-06-07 NOTE — PLAN OF CARE
[facility] is at full capacity, [facility] stated she would call if able to accept. I called [facility] and left message for Lidia to return my call. She called earlier and stated their [facility] would accept if could not find somewhere in [place] to accept soon.
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
## 2022-06-07 NOTE — PROGRESS NOTES
I was contacted by the Trauma team attending judy  at 7:15 PM. Patient has a severe upper extremity amputation/injury. Will need immediate attention once cleared out of imaging. Plan is for revision amputation with possible closure versus wound VAC application. For consult to follow.    Imaging pending     Mino

## 2022-06-07 NOTE — HOSPITAL COURSE
Patient has had various orthopedic injury  revisions and is now resting in the intensive care unit.  She has been hemodynamically stable throughout and has had a stable hemoglobin level since admission.

## 2022-06-07 NOTE — PROGRESS NOTES
CTA reveals contrast blush and larger right retroperitoneal hematoma.     vasc IR consulted for possible embolization        Small bilateral pneumothoraces noted     HD stable     Labs pending
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
## 2022-06-07 NOTE — CONSULTS
[facility]                       [address] [place] [address]    PATIENT NAME:       KOLE REGAN  YOB: 2000  CSN:                [id number]   MRN:                [id number]  ADMIT DATE:         06/06/2022 18:35:00  PHYSICIAN:          Mukul Denise MD                            CONSULTATION    DATE OF CONSULT:  06/06/2022 00:00:00    REASON FOR INTRAOPERATIVE CONSULT:  Repair of left eyebrow/scalp and left eyelid   lacerations.  I was asked by the Trauma Service to evaluate the patient's left   frontal scalp/forehead and eyelid laceration.    PROCEDURES:    1. Left eyebrow/forehead laceration repair measuring approximately 4 cm in length.  2. Left forehead laceration repair measuring 1 cm in length.  3. Left upper eyelid laceration repair measuring 2.5 cm in length.    DESCRIPTION OF PROCEDURE:  The patient was prepped and draped in a sterile   fashion.  Patient was completely washed out with sterile normal saline.  After   this was accomplished, I evaluated that she had 3 lacerations.  She had a small   laceration on the forehead that was approximately 1 cm in length that was   repaired with 5-0 nylon suture.  She had an approximately 2.5 cm laceration on   the left upper eyelid that was repaired with interrupted 5-0 nylon suture and   then she had a left eyebrow onto the forehead laceration that was 4-0Vicryl in   the deep dermis and then 5-0 nylon on the skin.  Bacitracin was used as a   dressing. There were no complications.  All counts were correct.        ______________________________  Mukul Denise MD    JPL/AQS  DD:  06/06/2022  Time:  08:58PM  DT:  06/06/2022  Time:  09:55PM  Job #:  [id number]      CONSULTATION

## 2022-06-07 NOTE — TERTIARY TRAUMA SURVEY NOTE
TERTIARY TRAUMA SURVEY (TTS)    List Injuries Identified to Date:  T11-12 vertebral body fractures w/ severe canal stenosis  Traumatic LUE amputation  Right superior and inferior pubic rami fractures  Sacral fracture  Pelvic hematoma  Small right retroperitoneal hematoma  Small bilateral pneumothoraces      List Operative and Procedures:  LUE debridement and completion amputation    Past Medical History:  Active Ambulatory Problems     Diagnosis Date Noted    No Active Ambulatory Problems     Resolved Ambulatory Problems     Diagnosis Date Noted    No Resolved Ambulatory Problems     No Additional Past Medical History     2. History reviewed. No pertinent past medical history.      Physical Exam  Constitutional:       General: She is not in acute distress.  HENT:      Head: Normocephalic.      Comments: Repaired forehead lacerations c/d/i     Right Ear: External ear normal.      Left Ear: External ear normal.      Nose: No congestion or rhinorrhea.      Mouth/Throat:      Mouth: Mucous membranes are moist.      Pharynx: No oropharyngeal exudate.   Eyes:      Extraocular Movements: Extraocular movements intact.      Pupils: Pupils are equal, round, and reactive to light.   Cardiovascular:      Rate and Rhythm: Normal rate and regular rhythm.      Pulses: Normal pulses.   Pulmonary:      Effort: Pulmonary effort is normal. No respiratory distress.   Abdominal:      General: There is no distension.      Palpations: Abdomen is soft.      Comments: Mild lower abdominal tenderness   Musculoskeletal:      Cervical back: Normal range of motion. No tenderness.      Comments: No motor to bilateral lower extremities, sensation intact down to shins, LUE s/p amputation with wound vac in place   Skin:     General: Skin is warm and dry.   Neurological:      Mental Status: She is alert.      Cranial Nerves: No cranial nerve deficit.      Comments: No motor function to bilateral lower extremities, sensation intact down to shins    Psychiatric:         Mood and Affect: Mood normal.         Behavior: Behavior normal.         Imaging Findings Review:  X-Ray Chest 1 View    Result Date: 6/7/2022  EXAMINATION:  XR CHEST 1 VIEW    CPT 47113    CLINICAL HISTORY:  r/o bleeding or hemorrhage;    FINDINGS:  Examination reveals mediastinal cardiac silhouette to be within normal limits lung fields are clear and free of gross infiltrates atelectasis or effusions there is perhaps a small left apical pneumothorax no clear right pneumothorax identified    No other abnormalities seen    There is a compression deformity of T12 correlation with other imaging modalities might prove helpful for further assessment        X-Ray Chest 1 View    Result Date: 6/7/2022  EXAMINATION:  XR CHEST 1 VIEW    CLINICAL HISTORY:  pneumothorax;    TECHNIQUE:  Frontal view(s) of the chest.    COMPARISON:  Radiography 06/06/2022    FINDINGS:  Suspected tiny left apical pneumothorax.  No definitive pneumothorax identified on the right.  Lungs hypoinflated with no consolidation or significant pleural fluid.  Normal cardiac silhouette.        X-Ray Forearm Left    Result Date: 6/7/2022  EXAMINATION:  XR FOREARM LEFT    CLINICAL HISTORY:  Complete traumatic amputation at level between elbow and wrist, left arm, initial encounter    COMPARISON:  None.    FINDINGS:  There is evidence of traumatic complication of the distal forearm including portions of the radius with the ulna perhaps closely intact radiopaque bandaging is identified        X-Ray Femur 2 View Right    Result Date: 6/7/2022  EXAMINATION:  XR FEMUR 2 VIEW RIGHT    CLINICAL HISTORY:  s/p trauma;    COMPARISON:  None.    FINDINGS:  There are fractures of the superior and inferior pubic ramus close to the symphysis pubis femur is free of fractures or dislocations articular spaces otherwise preserved with smooth articular surfaces    Joint spaces preserved.        CT Head Without Contrast    Result Date:  6/6/2022  EXAMINATION:  CT HEAD WITHOUT CONTRAST; CT CERVICAL SPINE WITHOUT CONTRAST    CLINICAL HISTORY:  Trauma;    TECHNIQUE:  Multiple cross-section of the head were obtained without the use of contrast.  Coronal and sagittal reformatted images were obtained.  Automated dose exposure technique was utilized which limits radiation does the patient.    COMPARISON:  None    FINDINGS:  No acute ischemic changes or infarct.  No intraparenchymal hemorrhage or contusion.  No mass, mass effect midline shift, edema, or extra-axial fluid collection.  The gray-white matter differentiation maintained.  The cerebral sulci and basal cisterns are normal.  No hydrocephalus.    Scalp hematoma is identified along the apex.  No depressed skull fracture.  Questionable subcutaneous emphysema in the space and mastication.    Cervical spine:    The alignment curvature of the cervical spine is normal.  The vertebral heights and intervertebral disc spaces are maintained.  No evidence for compression deformity, fracture, or subluxation.  The predental space and prevertebral soft tissues are normal.  No evidence for central canal stenosis or neural foraminal narrowing.    The soft tissues of neck are normal.  The lung apices are clear.        CT Cervical Spine Without Contrast    Result Date: 6/6/2022  EXAMINATION:  CT HEAD WITHOUT CONTRAST; CT CERVICAL SPINE WITHOUT CONTRAST    CLINICAL HISTORY:  Trauma;    TECHNIQUE:  Multiple cross-section of the head were obtained without the use of contrast.  Coronal and sagittal reformatted images were obtained.  Automated dose exposure technique was utilized which limits radiation does the patient.    COMPARISON:  None    FINDINGS:  No acute ischemic changes or infarct.  No intraparenchymal hemorrhage or contusion.  No mass, mass effect midline shift, edema, or extra-axial fluid collection.  The gray-white matter differentiation maintained.  The cerebral sulci and basal cisterns are normal.  No  hydrocephalus.    Scalp hematoma is identified along the apex.  No depressed skull fracture.  Questionable subcutaneous emphysema in the space and mastication.    Cervical spine:    The alignment curvature of the cervical spine is normal.  The vertebral heights and intervertebral disc spaces are maintained.  No evidence for compression deformity, fracture, or subluxation.  The predental space and prevertebral soft tissues are normal.  No evidence for central canal stenosis or neural foraminal narrowing.    The soft tissues of neck are normal.  The lung apices are clear.        X-Ray Pelvis Routine AP    Result Date: 6/7/2022  EXAMINATION:  XR PELVIS ROUTINE AP    CLINICAL HISTORY:  r/o bleeding or hemorrhage;    TECHNIQUE:  AP view of the pelvis was performed.    COMPARISON:  None.    FINDINGS:  There are bilateral pelvic fractures involving the right iliac bone, bilateral pubic rami and sacrum.  There is mild distraction of the right pubic bone fractures.  Please see follow-up CT exam.    Regional soft tissues are unremarkable.        CT Chest Abdomen Pelvis With Contrast (xpd)    Result Date: 6/6/2022  EXAMINATION:  CT CHEST ABDOMEN PELVIS WITH CONTRAST (XPD)    CLINICAL HISTORY:  Trauma;    TECHNIQUE:  Multiple cross-section of the chest, abdomen, and pelvis were obtained after the intravenous administration of contrast.  Coronal and sagittal reformatted images were obtained.  An automated dose exposure technique was utilized which limits radiation does the patient.    COMPARISON:  None    FINDINGS:  Chest:    Heart size within normal limits.  The course and caliber of the thoracic aorta is normal.  A triple vessel aortic arch is identified.  The great vessels are widely patent.  No evidence for aortic injury nor mediastinal hematoma.  Likely remanent thymic tissues identified.  Main pulmonary artery is normal caliber.  No evidence for hilar or mediastinal adenopathy.    Small bilateral pneumothoraces without  tension pneumothorax.  Coarse interstitial markings lung parenchyma.  No evidence for pulmonary laceration nor contusion.  No consolidation.  No pleural thickening.  The trachea and airway are patent.    Abdomen/pelvis:    The liver, spleen, adrenals, kidneys, and pancreas are normal.  Gallbladder is present.    Small hiatal hernia.  Small bowel is of normal caliber.  The root of the mesentery is normal.  Colon is of normal caliber.  Normal appendix.    The uterus is anteverted.  No evidence for adnexal mass.  The bladder is distended and normal.  The course and caliber of the abdominal aorta is normal.  No evidence for aortic injury.  No free fluid in the in the mesentery.  Pelvic sidewall hematoma is identified.  In the region of fracture of the right iliac wing and concern for pseudoaneurysm of and anterior branches of the internal iliac artery is identified.    Bilateral fractures of the inferior pubic rami and superior pubic rami are identified the right-sided placed.  Intra-articular extension on the left.  The proximal femora are intact.  Fracture of the right iliac wing with extension into the SI joint with questionable widening.  Complete transection of the sacrum/coccyx is identified with osseous fragments.  Complex fracture through the left sacral ala involving the nerve roots of S1, S2, and S3.  A retroperitoneal hematoma is identified secondary to the complex fractures.  No evidence for active extravasation.  The complex fractures identified of the T12 vertebral body involving both the anterior posterior elements with retropulsion causing severe central canal stenosis.  Osseous fragments are identified.  Punctate hyperintensities are identified which could represent hematoma within the spinal cord.  Epidural hematoma is not entirely excluded.  Also a fracture of the T11 vertebral bodies identified along the superior endplate with  extension into the inferior aspect of the elements with destruction.   Soft tissues are grossly normal.        CTA Abdomen and Pelvis    Result Date: 6/7/2022  EXAMINATION:  CTA ABDOMEN AND PELVIS    CLINICAL HISTORY:  pseudoaneurysm right internal iliac;    TECHNIQUE:  Contiguous axial CT images were obtained through the abdomen and pelvis WITH and WITHOUT the administration of intravenous contrast as per angiography protocol.    Coronal, sagittal, mid and 3-D reconstructions were obtained from the axial data set on an independent workstation under the direct supervision of the radiologist.    Automatic exposure control was utilized to limit radiation dose.    Radiation Dose:    Total DLP: 314 mGy*cm    COMPARISON:  Chest/abdominopelvic CT earlier the same day    FINDINGS:  Thorax:    Lungs: There is nonspecific dependent change at the lung bases. Again noted are small bilateral pneumothoraces.    Pleura: There is minimal left hemothorax.    Heart: The heart size is within normal limits.    Abdomen:    Abdominal Wall: No abdominal wall pathology is seen.    Liver: The liver appears unremarkable.    Biliary System: No extrahepatic biliary duct dilatation is seen.    Gallbladder: The gallbladder appears unremarkable.    Pancreas: The pancreas appears unremarkable.    Spleen: The spleen appears unremarkable.    Adrenals: The adrenal glands appear unremarkable.    Kidneys: The kidneys appear unremarkable with no stones cysts masses or hydronephrosis.    Aorta: There is hematoma along the distal thoracic aorta and proximal abdominal aorta (series 16 image [id number]) secondary to burst fracture of T12 vertebra. The aorta is otherwise unremarkable without aortic dissection or rupture. Again noted is focal stenosis at the origin of the celiac trunk (series 20 image 67). This may be secondary to compression by the arcuate ligament of the diaphragm. The superior mesenteric, bilateral renal and inferior mesenteric arteries appear unremarkable. Bilateral common iliac, internal iliac and external iliac  arteries appear unremarkable. There is focal contrast blush of one of the posterior branches of the right internal iliac artery adjacent to the right iliac fracture (series 16 image [id number]). without gross hematoma identified. Again noted is posterior pelvic hematoma, which is not significantly changed since the prior examination. There is a small right retroperitoneal hemorrhage which has increased in size since the prior examination (series 16    image [id number]). This may be secondary to vasospasm. However, the possibility of a vascular injury cannot be excluded. Correlate with clinical and laboratory findings as regards additional evaluation and serial interval follow up.    IVC: Unremarkable.    Bowel:    Esophagus: The visualized esophagus appears unremarkable.    Stomach: The stomach appears unremarkable.    Duodenum: Unremarkable appearing duodenum.    Small Bowel: The small bowel appears unremarkable.    Colon: Nondistended.    Appendix: The appendix appears unremarkable.    Peritoneum: No intraperitoneal free fluid or gas is seen.    Pelvis:    Bladder: The bladder is nondistended and cannot be definitively evaluated. A knapp catheter is in place.    Female:    Uterus: The uterus appears unremarkable.    Ovaries: No adnexal masses are seen.    Bony structures: Again noted are multiple comminuted bilateral pelvic and sacral fractures.    Notifications: The results were discussed prior to dictation with the patient's nurse (Melissa) at 2022-06-06 23:51:51 CDT.        Results for orders placed or performed during the hospital encounter of 06/06/22   Comprehensive metabolic panel   Result Value Ref Range    Sodium Level 140 136 - 145 mmol/L    Potassium Level 4.0 3.5 - 5.1 mmol/L    Chloride 109 (H) 98 - 107 mmol/L    Carbon Dioxide 20 (L) 22 - 29 mmol/L    Glucose Level 191 (H) 74 - 100 mg/dL    Blood Urea Nitrogen 13.4 7.0 - 18.7 mg/dL    Creatinine 1.04 (H) 0.55 - 1.02 mg/dL    Calcium Level Total 8.7 8.4 -  10.2 mg/dL    Protein Total 6.0 (L) 6.4 - 8.3 gm/dL    Albumin Level 3.5 3.5 - 5.0 gm/dL    Globulin 2.5 2.4 - 3.5 gm/dL    Albumin/Globulin Ratio 1.4 1.1 - 2.0 ratio    Bilirubin Total 0.6 <=1.5 mg/dL    Alkaline Phosphatase 98 40 - 150 unit/L    Alanine Aminotransferase 147 (H) 0 - 55 unit/L    Aspartate Aminotransferase 257 (H) 5 - 34 unit/L    Estimated GFR-Non  >60 mls/min/1.73/m2   Protime-INR   Result Value Ref Range    PT 16.0 (H) 12.5 - 14.5 seconds    INR 1.30 0.00 - 1.30   APTT   Result Value Ref Range    PTT 27.1 23.2 - 33.7 seconds   Lactic Acid, Plasma   Result Value Ref Range    Lactic Acid Level 3.0 (H) 0.5 - 2.2 mmol/L   Urinalysis, Reflex to Urine Culture    Specimen: Urine   Result Value Ref Range    Color, UA Yellow Yellow, Colorless, Other, Clear    Appearance, UA Clear Clear    Specific Gravity, UA >=1.040 (H) 1.001 - 1.030    pH, UA 5.5 5.0, 5.5, 6.0, 6.5, 7.0, 7.5, 8.0, 8.5    Protein, UA 1+ (A) Negative, 300  mg/dL    Glucose, UA Negative Negative, Normal mg/dL    Ketones, UA Negative Negative, +1, +2, +3, +4, +5, >=160 mg/dL    Blood, UA 3+ (A) Negative unit/L    Bilirubin, UA Negative Negative mg/dL    Urobilinogen, UA 1.0 0.2, 1.0, Normal mg/dL    Nitrites, UA Negative Negative    Leukocyte Esterase, UA Negative Negative, 75  unit/L   Drug Screen, Urine   Result Value Ref Range    Amphetamines, Urine Negative Negative    Barbituates, Urine Negative Negative    Benzodiazepine, Urine Positive (A) Negative    Cannabinoids, Urine Negative Negative    Cocaine, Urine Negative Negative    Fentanyl, Urine Positive (A) Negative    MDMA, Urine Negative Negative    Opiates, Urine Positive (A) Negative    Phencyclidine, Urine Negative Negative    pH, Urine 5.5 3.0 - 11.0    Specific Gravity, Urine Auto >=1.040 (H) 1.001 - 1.035   Ethanol   Result Value Ref Range    Ethanol Level <10.0 <=10.0 mg/dL   CBC with Differential   Result Value Ref Range    WBC 24.5 (H) 4.5 - 11.5 x10(3)/mcL     RBC 4.28 4.20 - 5.40 x10(6)/mcL    Hgb 13.3 12.0 - 16.0 gm/dL    Hct 38.7 37.0 - 47.0 %    MCV 90.4 80.0 - 94.0 fL    MCH 31.1 (H) 27.0 - 31.0 pg    MCHC 34.4 33.0 - 36.0 mg/dL    RDW 13.7 11.5 - 17.0 %    Platelet 346 130 - 400 x10(3)/mcL    MPV 9.8 9.4 - 12.4 fL    Neut % 82.7 %    Lymph % 10.8 %    Mono % 3.1 %    Eos % 0.6 %    Basophil % 0.4 %    Lymph # 2.64 0.6 - 4.6 x10(3)/mcL    Neut # 20.3 (H) 2.1 - 9.2 x10(3)/mcL    Mono # 0.75 0.1 - 1.3 x10(3)/mcL    Eos # 0.14 0 - 0.9 x10(3)/mcL    Baso # 0.10 0 - 0.2 x10(3)/mcL    IG# 0.60 (H) 0 - 0.0155 x10(3)/mcL    IG% 2.4 (H) 0 - 0.43 %    NRBC% 0.0 %   Lactic Acid, Plasma   Result Value Ref Range    Lactic Acid Level 2.9 (H) 0.5 - 2.2 mmol/L   Protime-INR   Result Value Ref Range    PT 15.5 (H) 12.5 - 14.5 seconds    INR 1.24 0.00 - 1.30   APTT   Result Value Ref Range    PTT 20.2 (L) 23.2 - 33.7 seconds   Lactic Acid, Plasma   Result Value Ref Range    Lactic Acid Level 1.6 0.5 - 2.2 mmol/L   CBC with Differential   Result Value Ref Range    WBC 11.8 (H) 4.5 - 11.5 x10(3)/mcL    RBC 4.28 4.20 - 5.40 x10(6)/mcL    Hgb 12.7 12.0 - 16.0 gm/dL    Hct 36.7 (L) 37.0 - 47.0 %    MCV 85.7 80.0 - 94.0 fL    MCH 29.7 27.0 - 31.0 pg    MCHC 34.6 33.0 - 36.0 mg/dL    RDW 14.8 11.5 - 17.0 %    Platelet 164 130 - 400 x10(3)/mcL    MPV 10.4 9.4 - 12.4 fL    Neut % 90.0 %    Lymph % 2.1 %    Mono % 7.3 %    Eos % 0.0 %    Basophil % 0.2 %    Lymph # 0.25 (L) 0.6 - 4.6 x10(3)/mcL    Neut # 10.7 (H) 2.1 - 9.2 x10(3)/mcL    Mono # 0.86 0.1 - 1.3 x10(3)/mcL    Eos # 0.00 0 - 0.9 x10(3)/mcL    Baso # 0.02 0 - 0.2 x10(3)/mcL    IG# 0.05 (H) 0 - 0.0155 x10(3)/mcL    IG% 0.4 0 - 0.43 %    NRBC% 0.0 %   Comprehensive Metabolic Panel   Result Value Ref Range    Sodium Level 138 136 - 145 mmol/L    Potassium Level 3.5 3.5 - 5.1 mmol/L    Chloride 106 98 - 107 mmol/L    Carbon Dioxide 23 22 - 29 mmol/L    Glucose Level 152 (H) 74 - 100 mg/dL    Blood Urea Nitrogen 11.2 7.0 - 18.7 mg/dL     Creatinine 0.88 0.55 - 1.02 mg/dL    Calcium Level Total 8.7 8.4 - 10.2 mg/dL    Protein Total 5.8 (L) 6.4 - 8.3 gm/dL    Albumin Level 3.4 (L) 3.5 - 5.0 gm/dL    Globulin 2.4 2.4 - 3.5 gm/dL    Albumin/Globulin Ratio 1.4 1.1 - 2.0 ratio    Bilirubin Total 1.4 <=1.5 mg/dL    Alkaline Phosphatase 86 40 - 150 unit/L    Alanine Aminotransferase 129 (H) 0 - 55 unit/L    Aspartate Aminotransferase 242 (H) 5 - 34 unit/L    Estimated GFR-Non  >60 mls/min/1.73/m2   Magnesium   Result Value Ref Range    Magnesium Level 2.00 1.60 - 2.60 mg/dL   Phosphorus   Result Value Ref Range    Phosphorus Level 2.8 2.3 - 4.7 mg/dL   Comprehensive Metabolic Panel   Result Value Ref Range    Sodium Level 139 136 - 145 mmol/L    Potassium Level 4.1 3.5 - 5.1 mmol/L    Chloride 107 98 - 107 mmol/L    Carbon Dioxide 25 22 - 29 mmol/L    Glucose Level 142 (H) 74 - 100 mg/dL    Blood Urea Nitrogen 10.4 7.0 - 18.7 mg/dL    Creatinine 0.83 0.55 - 1.02 mg/dL    Calcium Level Total 8.8 8.4 - 10.2 mg/dL    Protein Total 5.9 (L) 6.4 - 8.3 gm/dL    Albumin Level 3.4 (L) 3.5 - 5.0 gm/dL    Globulin 2.5 2.4 - 3.5 gm/dL    Albumin/Globulin Ratio 1.4 1.1 - 2.0 ratio    Bilirubin Total 1.4 <=1.5 mg/dL    Alkaline Phosphatase 84 40 - 150 unit/L    Alanine Aminotransferase 123 (H) 0 - 55 unit/L    Aspartate Aminotransferase 210 (H) 5 - 34 unit/L    Estimated GFR-Non  >60 mls/min/1.73/m2   Magnesium   Result Value Ref Range    Magnesium Level 2.00 1.60 - 2.60 mg/dL   Phosphorus   Result Value Ref Range    Phosphorus Level 4.3 2.3 - 4.7 mg/dL   CBC with Differential   Result Value Ref Range    WBC 11.6 (H) 4.5 - 11.5 x10(3)/mcL    RBC 4.36 4.20 - 5.40 x10(6)/mcL    Hgb 12.8 12.0 - 16.0 gm/dL    Hct 37.8 37.0 - 47.0 %    MCV 86.7 80.0 - 94.0 fL    MCH 29.4 27.0 - 31.0 pg    MCHC 33.9 33.0 - 36.0 mg/dL    RDW 15.0 11.5 - 17.0 %    Platelet 178 130 - 400 x10(3)/mcL    MPV 9.5 9.4 - 12.4 fL    Neut % 88.8 %    Lymph % 3.5 %     Mono % 7.1 %    Eos % 0.0 %    Basophil % 0.2 %    Lymph # 0.40 (L) 0.6 - 4.6 x10(3)/mcL    Neut # 10.3 (H) 2.1 - 9.2 x10(3)/mcL    Mono # 0.82 0.1 - 1.3 x10(3)/mcL    Eos # 0.00 0 - 0.9 x10(3)/mcL    Baso # 0.02 0 - 0.2 x10(3)/mcL    IG# 0.05 (H) 0 - 0.0155 x10(3)/mcL    IG% 0.4 0 - 0.43 %    NRBC% 0.0 %   Urinalysis, Microscopic   Result Value Ref Range    RBC, UA 50 (H) <=5 /HPF    WBC, UA <5 <=5 /HPF    Squamous Epithelial Cells, UA <4 <=4 /LPF    Bacteria, UA None Seen None Seen, Rare, Occasional /HPF   Prepare Fresh Frozen Plasma   Result Value Ref Range    UNIT NUMBER [id number]     UNIT ABO/RH A POS     DISPENSE STATUS Issued     Unit Expiration [id number]     Product Code I6239S99     Unit Blood Type Code 6200     CROSSMATCH INTERPRETATION Not required    Prepare RBC   Result Value Ref Range    UNIT NUMBER [id number]     UNIT ABO/RH O NEG     DISPENSE STATUS Issued     Unit Expiration [id number]     Product Code Y8340K26     Unit Blood Type Code 9500     CROSSMATCH INTERPRETATION Compatible     UNIT NUMBER [id number]     UNIT ABO/RH O NEG     DISPENSE STATUS Issued     Unit Expiration [id number]     Product Code O5367I08     Unit Blood Type Code 9500     CROSSMATCH INTERPRETATION Compatible     UNIT NUMBER [id number]     UNIT ABO/RH O NEG     DISPENSE STATUS Issued     Unit Expiration [id number]     Product Code M5515C94     Unit Blood Type Code 9500     CROSSMATCH INTERPRETATION Compatible          Lab Review:  Troponin:  No results for input(s): TROPONINI in the last 2160 hours.  CBC:  Recent Labs   Lab Result Units 06/06/22  1926 06/07/22  0032 06/07/22  0408   WBC x10(3)/mcL 24.5* 11.8* 11.6*   RBC x10(6)/mcL 4.28 4.28 4.36   Hgb gm/dL 13.3 12.7 12.8   Hct % 38.7 36.7* 37.8   Platelet x10(3)/mcL 346 164 178   MCV fL 90.4 85.7 86.7   MCH pg 31.1* 29.7 29.4   MCHC mg/dL 34.4 34.6 33.9     CMP:  Recent Labs   Lab Result Units 06/06/22  1925 06/07/22  0032 06/07/22  0408   Calcium Level  Total mg/dL 8.7 8.7 8.8   Albumin Level gm/dL 3.5 3.4* 3.4*   Sodium Level mmol/L 140 138 139   Potassium Level mmol/L 4.0 3.5 4.1   Carbon Dioxide mmol/L 20* 23 25   Blood Urea Nitrogen mg/dL 13.4 11.2 10.4   Creatinine mg/dL 1.04* 0.88 0.83   Alkaline Phosphatase unit/L 98 86 84   Alanine Aminotransferase unit/L 147* 129* 123*   Aspartate Aminotransferase unit/L 257* 242* 210*   Bilirubin Total mg/dL 0.6 1.4 1.4     Lactic Acid:  Invalid input(s): LACTATIC  Etoh:  No results for input(s): ALCOHOLMEDIC in the last 2160 hours.  Drug Screen:  No results for input(s): PCDSCOMETHA, COCAINEMETAB, OPIATESCREEN, BARBITURATES, AMPHETAMINES, MARIJUANATHC, PCDSOPHENCYN, CREATRANDUR, TOXINFO in the last 2160 hours.    Plan:  Neuro: NSGY following, will require transfer for repair of complex spinal fracture, q1h neurochecks, multimodal pain control  Cardiac: HDS,   Pulm: Stable  FEN/GI: NPO, mIVF, replace lytes as needed  Renal: Patel in place, monitor UOP  Heme: q6 CBC, has remained stable, repeat CTA when able  ID: Ancef  MSK: Ortho following, s/p LUE debridement and amputation, pelvic ring fixation deferred until spine is stabilized     Dispo: Transfer to [facility] for further Neurosurgical care    Rochelle Huerta MD  [facility] General Surgery, PGY-2

## 2022-06-07 NOTE — TRANSFER OF CARE
Anesthesia Transfer of Care Note    Patient: Ilan Espinoza    Procedure(s) Performed: Procedure(s) (LRB):  AMPUTATION, UPPER EXTREMITY (Left)  REPAIR, LACERATION    Patient location: PACU    Anesthesia Type: general    Transport from OR: Transported from OR on room air with adequate spontaneous ventilation    Post pain: adequate analgesia    Post assessment: no apparent anesthetic complications    Post vital signs: stable    Level of consciousness: responds to stimulation    Nausea/Vomiting: no nausea/vomiting    Complications: none    Transfer of care protocol was followed      Last vitals:   Visit Vitals  BP (!) 95/40   Pulse 95   Temp 36.9 °C (98.4 °F)   Resp (!) 28   SpO2 98%

## 2022-06-07 NOTE — NURSING
Updated family about transfer plans; gave report to Danelle Mcfarland RN at [facility]

## 2022-06-07 NOTE — ANESTHESIA PROCEDURE NOTES
Intubation    Date/Time: 6/6/2022 7:58 PM  Performed by: Beto Choi CRNA  Authorized by: Jose L Corcoran MD     Intubation:     Induction:  Intravenous    Intubated:  Postinduction    Attempts:  1    Attempted By:  CRNA    Method of Intubation:  Video laryngoscopy    Blade:  Albirght 4    Laryngeal View Grade: Grade I - full view of cords      Difficult Airway Encountered?: No      Complications:  None    Airway Device:  Oral endotracheal tube    Airway Device Size:  7.0    Style/Cuff Inflation:  Cuffed    Inflation Amount (mL):  6    Tube secured:  21    Secured at:  The lips    Placement Verified By:  Capnometry    Complicating Factors:  None    Findings Post-Intubation:  BS equal bilateral and atraumatic/condition of teeth unchanged

## 2022-06-07 NOTE — OP NOTE
OPERATIVE REPORT      Patient: Ilan Espinoza   : 2000    MRN: [id number]  Date: 2022      Surgeon:Chao Herzog DO  Assistant: James Abraham was essential, part of the procedure including deep hardware placement and deep closure.  No senior assistant was availible  Preoperative Diagnosis:  Left carpus/wrist pneumatic amputation with open fracture of the distal radius, Right pelvic ring fracture  Postoperative Diagnosis: Same  Procedure:   1 Left forearm revision amputation,  2 Excisional debridement of open fracture Left radius  3 Wound vac application less than 50sq cm  Anesthesiologist: Jose L Corcoran MD  OR Staff: Circulator: Karime Loyola RN; Maribel Casper RN  Scrub Person: Jesse Hughes  Implants: * No implants in log *  EBL: See anesthesia note  Complications: None  Disposition: To ICU, stable    Indications: Ilan Espinoza is a 21 y.o. female presenting with the aforementioned injuries/findings. The procedure is indicated to clean the wounds and revise amputate necrotic tissue with staged closure.  The patient is obtunded.  Patient was taken emergently to the OR under 2 surgeon consent.  Dr. Denise with plastics and I evaluated the traumatically amputated extremity along with the left upper extremity had severe contamination with organic material along fascial planes including large leaves and sticks and gravel.  Patient also had complete avulsion of her flexor and extensor tendons from muscle bellies of proximal in the elbow.  Family was not able to be reached due the patient's current status revision amputation was completed as stated below.    Procedure Note:  The patient was brought back to the OR and placed supine on the OR table. After successful induction of anesthesia by anesthesia staff, the patient was positioned in the supine position and all bony prominences were padded appropriately.  The surgical field was then provisionally cleansed and then prepped and draped  in the usual sterile fashion.    At this time a time-out was performed, with the correct patient, site, and procedure identified.  The universal time out as well as sign your site protocols were followed.  Preoperative antibiotics were verified as administered.     My attention is drawn to the left upper extremity a left distal 3rd radius fractures appreciated which was avulsed with the distal aspect of the carpus.  The distal ulna is completely skeletonized there is severe organic soft tissue contamination in this area along with gravel sticks and mod along with leaves of fascial planes.  All tendons were avulsed from proximal muscle bellies making reattachment and possible.  I begun with an excisional debridement extending the incision proximally on the radial and ulnar side of the formed.  Once I was able to track to clean fascial planes we then began to amputate and inject neurovascular structures and tie radial and ulnar arteries.  Satisfied with our excisionally debrided wound bed with a 15 blade curette rongeur we then began saw cuts on the radius and ulna on equal lengths.  Due to severe comminution it was then decided to have provisional closure with staples along the radial and ulnar aspect of the formed followed by wound VAC placement (4x4cm).    The dressing was then applied appropriately the Ace wrap for compression.  Compartments are soft compressible.    The patient was then subsequently transferred to to ICU in a stable condition.    All sponge and needle counts were correct at the end of the case.  I was present and participated in all aspects of the procedure.    Prognosis:  Patient will likely return to the OR in 2 days for definitive closure and revision amputation.  Patient also has multiple other injuries which will coordinate with other specialties.  I reached out to the family which is still unable to be reached at this time will update them as soon as they arrive.  Patient has right-sided  pelvic ring appears stable she is neurovascularly compromise below the waist at initial presentation.  She is currently hemodynamically stable without binder.    This note/OR report was created with the assistance of  voice recognition software or phone  dictation.  There may be transcription errors as a result of using this technology however minimal. Effort has been made to assure accuracy of transcription but any obvious errors or omissions should be clarified with the author of the document.       Chao Herzog, DO  Orthopedic Trauma Surgery
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
## 2022-06-07 NOTE — ANESTHESIA PROCEDURE NOTES
Arterial    Diagnosis: trauma    Patient location during procedure: done in OR  Procedure end time: 6/6/2022 8:15 PM    Staffing  Authorizing Provider: Jose L Corcoran MD  Performing Provider: Beto Choi CRNA    Anesthesiologist was present at the time of the procedure.    Preanesthetic Checklist  Completed: patient identified, IV checked, site marked, risks and benefits discussed, surgical consent, monitors and equipment checked, pre-op evaluation, timeout performed and anesthesia consent givenArterial  Skin Prep: chlorhexidine gluconate and isopropyl alcohol  Orientation: right  Location: radial    Catheter Size: 20 G  Catheter placement by Ultrasound guidance. Heme positive aspiration all ports.   Vessel Caliber: small, patent, compressibility normal  Needle advanced into vessel with real time Ultrasound guidance.  Guidewire confirmed in vessel.Insertion Attempts: 2  Assessment  Dressing: secured with tape and tegaderm
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
## 2022-06-08 PROBLEM — S22.089A CLOSED T11 FRACTURE: Status: ACTIVE | Noted: 2022-06-08

## 2022-06-08 PROBLEM — S27.0XXA TRAUMATIC PNEUMOTHORAX: Status: ACTIVE | Noted: 2022-06-08

## 2022-06-08 PROBLEM — S22.081A T12 BURST FRACTURE: Status: ACTIVE | Noted: 2022-06-08

## 2022-06-08 PROBLEM — N94.89 PELVIC HEMATOMA IN FEMALE: Status: ACTIVE | Noted: 2022-06-08

## 2022-06-08 PROBLEM — S32.301A CLOSED FRACTURE OF RIGHT ILIAC WING: Status: ACTIVE | Noted: 2022-06-08

## 2022-06-08 PROBLEM — S36.892A TRAUMATIC RETROPERITONEAL HEMATOMA: Status: ACTIVE | Noted: 2022-06-08

## 2022-06-08 PROBLEM — R74.01 TRANSAMINITIS: Status: ACTIVE | Noted: 2022-06-08

## 2022-06-08 PROBLEM — S22.082A: Status: ACTIVE | Noted: 2022-06-08

## 2022-06-08 PROBLEM — I72.3: Status: ACTIVE | Noted: 2022-06-08

## 2022-06-08 PROBLEM — S32.810A CLOSED PELVIC RING FRACTURE: Status: ACTIVE | Noted: 2022-06-08

## 2022-06-08 PROBLEM — G95.20 SPINAL CORD COMPRESSION: Status: ACTIVE | Noted: 2022-06-08

## 2022-06-08 PROBLEM — S32.10XA CLOSED FRACTURE OF SACRUM: Status: ACTIVE | Noted: 2022-06-08

## 2022-06-08 PROBLEM — G82.22: Status: ACTIVE | Noted: 2022-06-08

## 2022-06-08 PROBLEM — S00.03XA TRAUMATIC HEMATOMA OF SCALP: Status: ACTIVE | Noted: 2022-06-08

## 2022-06-08 NOTE — ANESTHESIA POSTPROCEDURE EVALUATION
Anesthesia Post Evaluation    Patient: Alexis Talavera    Procedure(s) Performed: Procedure(s) (LRB):  AMPUTATION, UPPER EXTREMITY (Left)  REPAIR, LACERATION    Final Anesthesia Type: general      Patient location during evaluation: ICU  Patient participation: No - Unable to Participate, Sedation  Level of consciousness: sedated  Post-procedure vital signs: reviewed and stable  Pain management: adequate  Airway patency: patent  CJ mitigation strategies: Multimodal analgesia  PONV status at discharge: No PONV  Anesthetic complications: no      Cardiovascular status: blood pressure returned to baseline and hemodynamically stable  Respiratory status: spontaneous ventilation  Hydration status: euvolemic  Follow-up not needed.          Vitals Value Taken Time   /63 06/07/22 2031   Temp 37.4 °C (99.32 °F) 06/07/22 2137   Pulse 91 06/07/22 2137   Resp 20 06/07/22 2137   SpO2 100 % 06/07/22 2137         Event Time   Out of Recovery 22:20:00         Pain/Bj Score: Pain Rating Prior to Med Admin: 10 (6/7/2022  8:51 PM)  Pain Rating Post Med Admin: 0 (6/7/2022  9:21 PM)  Bj Score: 9 (6/6/2022 10:10 PM)

## 2022-06-09 LAB
ESTROGEN SERPL-MCNC: NORMAL PG/ML
INSULIN SERPL-ACNC: NORMAL U[IU]/ML
LAB AP CLINICAL INFORMATION: NORMAL
LAB AP GROSS DESCRIPTION: NORMAL
LAB AP REPORT FOOTNOTES: NORMAL
T3RU NFR SERPL: NORMAL %

## 2022-06-10 PROBLEM — E87.8 HYPERCHLOREMIA: Status: RESOLVED | Noted: 2022-06-10 | Resolved: 2022-06-10

## 2022-06-10 PROBLEM — S24.104A: Status: ACTIVE | Noted: 2022-06-10

## 2022-06-10 PROBLEM — Z78.9 ALCOHOL USE: Status: ACTIVE | Noted: 2022-06-10

## 2022-06-10 PROBLEM — Z88.5 CODEINE ALLERGY: Status: ACTIVE | Noted: 2022-06-10

## 2022-06-10 PROBLEM — D72.829 LEUKOCYTOSIS: Status: ACTIVE | Noted: 2022-06-10

## 2022-06-10 PROBLEM — S22.089A: Status: ACTIVE | Noted: 2022-06-10

## 2022-06-10 PROBLEM — Z87.2 HISTORY OF BREAST ABSCESS: Status: ACTIVE | Noted: 2022-06-10

## 2022-06-10 PROBLEM — K44.9 HIATAL HERNIA WITHOUT GANGRENE OR OBSTRUCTION: Status: ACTIVE | Noted: 2022-06-10

## 2022-06-10 PROBLEM — E83.39 HYPOPHOSPHATEMIA: Status: ACTIVE | Noted: 2022-06-10

## 2022-06-10 PROBLEM — D72.829 LEUKOCYTOSIS: Status: RESOLVED | Noted: 2022-06-10 | Resolved: 2022-06-10

## 2022-06-10 PROBLEM — E87.20 LACTIC ACIDOSIS: Status: ACTIVE | Noted: 2022-06-10

## 2022-06-10 PROBLEM — E88.09 HYPOALBUMINEMIA: Status: ACTIVE | Noted: 2022-06-10

## 2022-06-10 PROBLEM — J30.2 SEASONAL ALLERGIES: Status: ACTIVE | Noted: 2022-06-10

## 2022-06-10 PROBLEM — S22.008A CLOSED FRACTURE OF SPINOUS PROCESS OF THORACIC VERTEBRA: Status: ACTIVE | Noted: 2022-06-10

## 2022-06-10 PROBLEM — D62 ACUTE BLOOD LOSS ANEMIA: Status: ACTIVE | Noted: 2022-06-10

## 2022-06-10 PROBLEM — D69.6 THROMBOCYTOPENIA: Status: RESOLVED | Noted: 2022-06-10 | Resolved: 2022-06-10

## 2022-06-10 PROBLEM — E83.39 HYPOPHOSPHATEMIA: Status: RESOLVED | Noted: 2022-06-10 | Resolved: 2022-06-10

## 2022-06-10 PROBLEM — E87.8 HYPERCHLOREMIA: Status: ACTIVE | Noted: 2022-06-10

## 2022-06-10 PROBLEM — R73.9 HYPERGLYCEMIA: Status: ACTIVE | Noted: 2022-06-10

## 2022-06-10 PROBLEM — E87.20 LACTIC ACIDOSIS: Status: RESOLVED | Noted: 2022-06-10 | Resolved: 2022-06-10

## 2022-06-10 PROBLEM — E80.6 HYPERBILIRUBINEMIA: Status: RESOLVED | Noted: 2022-06-10 | Resolved: 2022-06-10

## 2022-06-10 PROBLEM — E80.6 HYPERBILIRUBINEMIA: Status: ACTIVE | Noted: 2022-06-10

## 2022-06-10 PROBLEM — G82.20 PARAPLEGIA: Status: ACTIVE | Noted: 2022-06-08

## 2022-06-10 PROBLEM — D69.6 THROMBOCYTOPENIA: Status: ACTIVE | Noted: 2022-06-10

## 2022-06-14 LAB
FIO2: 100
GLUCOSE SERPL-MCNC: 145 MG/DL (ref 70–110)
HCO3 UR-SCNC: 25.2 MMOL/L (ref 24–28)
HCT VFR BLD CALC: 30 %PCV (ref 36–54)
HGB BLD-MCNC: 10 G/DL
PCO2 BLDA: 46.6 MMHG (ref 35–45)
PH SMN: 7.34 [PH] (ref 7.35–7.45)
PO2 BLDA: 550 MMHG (ref 80–100)
POC BE: -1 MMOL/L
POC IONIZED CALCIUM: 1.09 MMOL/L (ref 1.06–1.42)
POC PCO2 TEMP: 46 MMHG
POC PH TEMP: 7.35
POC PO2 TEMP: 548 MMHG
POC SATURATED O2: 100 % (ref 95–100)
POC TCO2: 27 MMOL/L (ref 23–27)
POC TEMPERATURE: ABNORMAL
POTASSIUM BLD-SCNC: 4.2 MMOL/L (ref 3.5–5.1)
SAMPLE: ABNORMAL
SODIUM BLD-SCNC: 138 MMOL/L (ref 136–145)

## 2022-06-15 PROBLEM — F41.9 ANXIETY: Status: ACTIVE | Noted: 2022-06-15

## 2022-06-15 PROBLEM — E87.1 HYPONATREMIA: Status: ACTIVE | Noted: 2022-06-15

## 2022-06-16 PROBLEM — F41.1 GENERALIZED ANXIETY DISORDER: Status: ACTIVE | Noted: 2022-06-16

## 2022-06-18 PROBLEM — S27.0XXA TRAUMATIC PNEUMOTHORAX: Status: RESOLVED | Noted: 2022-06-08 | Resolved: 2022-06-18

## 2022-06-22 PROBLEM — D72.829 LEUKOCYTOSIS: Status: RESOLVED | Noted: 2022-06-10 | Resolved: 2022-06-22

## 2022-06-22 PROBLEM — E87.1 HYPONATREMIA: Status: RESOLVED | Noted: 2022-06-15 | Resolved: 2022-06-22

## 2022-06-22 PROBLEM — I95.9 HYPOTENSION: Status: ACTIVE | Noted: 2022-06-22

## 2022-06-24 PROBLEM — B37.31 VAGINAL CANDIDIASIS: Status: ACTIVE | Noted: 2022-06-24

## 2022-06-26 PROBLEM — E87.1 HYPONATREMIA: Status: RESOLVED | Noted: 2022-06-15 | Resolved: 2022-06-26

## 2022-06-28 PROBLEM — I95.9 HYPOTENSION: Status: RESOLVED | Noted: 2022-06-22 | Resolved: 2022-06-28

## 2022-07-01 PROBLEM — B37.31 VAGINAL CANDIDIASIS: Status: RESOLVED | Noted: 2022-06-24 | Resolved: 2022-07-01

## 2022-07-01 PROBLEM — N30.00 ACUTE CYSTITIS WITHOUT HEMATURIA: Status: ACTIVE | Noted: 2022-07-01

## 2022-07-01 PROBLEM — R74.01 TRANSAMINITIS: Status: RESOLVED | Noted: 2022-06-08 | Resolved: 2022-07-01

## 2022-07-02 PROBLEM — N39.0 URINARY TRACT INFECTION WITHOUT HEMATURIA: Status: ACTIVE | Noted: 2022-07-01

## 2022-07-02 PROBLEM — N30.00 ACUTE CYSTITIS WITHOUT HEMATURIA: Status: RESOLVED | Noted: 2022-07-01 | Resolved: 2022-07-02

## (undated) DEVICE — SEE MEDLINE ITEM 157125

## (undated) DEVICE — STAPLER SKIN PROXIMATE WIDE

## (undated) DEVICE — SUT VICRYL 4-0 ANTIBACT

## (undated) DEVICE — SEE MEDLINE ITEM 157173

## (undated) DEVICE — GOWN SMARTGOWN 3XL XLONG

## (undated) DEVICE — SUT ETHILON 3-0 FS-1 30

## (undated) DEVICE — NDL 10CC 20GAX1 COMBO

## (undated) DEVICE — BANDAGE COMPR VELCLOSE 4INX5YD

## (undated) DEVICE — DRAPE STERI U-SHAPED 47X51IN

## (undated) DEVICE — KIT DRSNG GRNUFM MED 18X12X5CM

## (undated) DEVICE — DRESSING XEROFORM 5X9IN

## (undated) DEVICE — BANDAGE ESMARK 4INX3YD

## (undated) DEVICE — SUT CHROMIC 6-0 G-1

## (undated) DEVICE — GLOVE 9.0 PROTEXIS PI BLUE

## (undated) DEVICE — SEE MEDLINE ITEM 146292

## (undated) DEVICE — SPONGE LAP STRL 18X18IN

## (undated) DEVICE — SUT ETHILON 5-0 P3 18IN BLK

## (undated) DEVICE — GLOVE PROTEXIS HYDROGEL SZ6

## (undated) DEVICE — Device

## (undated) DEVICE — GOWN SMARTSLEEVE AAMI LVL4 XXL

## (undated) DEVICE — COVER FULLGUARD SHOE HIGH-TOP

## (undated) DEVICE — SUT ETHILON 5-0 P3 18IN CLR

## (undated) DEVICE — TAPE SILK 3IN

## (undated) DEVICE — SPONGE GAUZE 4X4 12 PLY STRL

## (undated) DEVICE — CANISTER INFOV.A.C WOUND 500ML

## (undated) DEVICE — GLOVE PROTEXIS NEU-THERA SZ6

## (undated) DEVICE — PADDING 4X4YD SPECIALIST100

## (undated) DEVICE — GLOVE PROTEXIS HYDROGEL SZ9